# Patient Record
Sex: MALE | Race: WHITE | NOT HISPANIC OR LATINO | Employment: STUDENT | URBAN - METROPOLITAN AREA
[De-identification: names, ages, dates, MRNs, and addresses within clinical notes are randomized per-mention and may not be internally consistent; named-entity substitution may affect disease eponyms.]

---

## 2019-07-09 ENCOUNTER — HOSPITAL ENCOUNTER (INPATIENT)
Facility: HOSPITAL | Age: 19
LOS: 3 days | Discharge: HOME | DRG: 201 | End: 2019-07-14
Attending: EMERGENCY MEDICINE | Admitting: SURGERY
Payer: COMMERCIAL

## 2019-07-09 DIAGNOSIS — R06.00 DYSPNEA: ICD-10-CM

## 2019-07-09 DIAGNOSIS — J93.9 PNEUMOTHORAX, UNSPECIFIED TYPE: ICD-10-CM

## 2019-07-09 DIAGNOSIS — J93.11 PRIMARY SPONTANEOUS PNEUMOTHORAX: ICD-10-CM

## 2019-07-09 DIAGNOSIS — R06.02 SOB (SHORTNESS OF BREATH): ICD-10-CM

## 2019-07-09 DIAGNOSIS — J93.9 PNEUMOTHORAX: Primary | ICD-10-CM

## 2019-07-09 LAB
ALBUMIN SERPL BCP-MCNC: 4.4 G/DL (ref 3.5–5.2)
ALP SERPL-CCNC: 69 U/L (ref 55–135)
ALT SERPL W/O P-5'-P-CCNC: 16 U/L (ref 10–44)
ANION GAP SERPL CALC-SCNC: 8 MMOL/L (ref 8–16)
AST SERPL-CCNC: 16 U/L (ref 10–40)
BASOPHILS # BLD AUTO: 0.01 K/UL (ref 0–0.2)
BASOPHILS NFR BLD: 0.2 % (ref 0–1.9)
BILIRUB SERPL-MCNC: 1.2 MG/DL (ref 0.1–1)
BUN SERPL-MCNC: 19 MG/DL (ref 6–20)
CALCIUM SERPL-MCNC: 9.8 MG/DL (ref 8.7–10.5)
CHLORIDE SERPL-SCNC: 108 MMOL/L (ref 95–110)
CO2 SERPL-SCNC: 26 MMOL/L (ref 23–29)
CREAT SERPL-MCNC: 0.9 MG/DL (ref 0.5–1.4)
DIFFERENTIAL METHOD: NORMAL
EOSINOPHIL # BLD AUTO: 0.1 K/UL (ref 0–0.5)
EOSINOPHIL NFR BLD: 2.1 % (ref 0–8)
ERYTHROCYTE [DISTWIDTH] IN BLOOD BY AUTOMATED COUNT: 12.3 % (ref 11.5–14.5)
EST. GFR  (AFRICAN AMERICAN): >60 ML/MIN/1.73 M^2
EST. GFR  (NON AFRICAN AMERICAN): >60 ML/MIN/1.73 M^2
GLUCOSE SERPL-MCNC: 70 MG/DL (ref 70–110)
HCT VFR BLD AUTO: 41.7 % (ref 40–54)
HGB BLD-MCNC: 14.2 G/DL (ref 14–18)
LYMPHOCYTES # BLD AUTO: 1.5 K/UL (ref 1–4.8)
LYMPHOCYTES NFR BLD: 28.9 % (ref 18–48)
MCH RBC QN AUTO: 29.5 PG (ref 27–31)
MCHC RBC AUTO-ENTMCNC: 34.1 G/DL (ref 32–36)
MCV RBC AUTO: 87 FL (ref 82–98)
MONOCYTES # BLD AUTO: 0.6 K/UL (ref 0.3–1)
MONOCYTES NFR BLD: 11.9 % (ref 4–15)
NEUTROPHILS # BLD AUTO: 3 K/UL (ref 1.8–7.7)
NEUTROPHILS NFR BLD: 56.9 % (ref 38–73)
PLATELET # BLD AUTO: 208 K/UL (ref 150–350)
PMV BLD AUTO: 10.7 FL (ref 9.2–12.9)
POTASSIUM SERPL-SCNC: 3.7 MMOL/L (ref 3.5–5.1)
PROT SERPL-MCNC: 6.8 G/DL (ref 6–8.4)
RBC # BLD AUTO: 4.81 M/UL (ref 4.6–6.2)
SODIUM SERPL-SCNC: 142 MMOL/L (ref 136–145)
WBC # BLD AUTO: 5.3 K/UL (ref 3.9–12.7)

## 2019-07-09 PROCEDURE — 80053 COMPREHEN METABOLIC PANEL: CPT

## 2019-07-09 PROCEDURE — 85025 COMPLETE CBC W/AUTO DIFF WBC: CPT

## 2019-07-09 PROCEDURE — 82962 GLUCOSE BLOOD TEST: CPT

## 2019-07-09 PROCEDURE — 99285 EMERGENCY DEPT VISIT HI MDM: CPT | Mod: 25

## 2019-07-10 PROBLEM — J93.9 PNEUMOTHORAX: Status: ACTIVE | Noted: 2019-07-10

## 2019-07-10 LAB — POCT GLUCOSE: 74 MG/DL (ref 70–110)

## 2019-07-10 PROCEDURE — S4991 NICOTINE PATCH NONLEGEND: HCPCS | Performed by: STUDENT IN AN ORGANIZED HEALTH CARE EDUCATION/TRAINING PROGRAM

## 2019-07-10 PROCEDURE — 25000003 PHARM REV CODE 250: Performed by: STUDENT IN AN ORGANIZED HEALTH CARE EDUCATION/TRAINING PROGRAM

## 2019-07-10 PROCEDURE — G0378 HOSPITAL OBSERVATION PER HR: HCPCS

## 2019-07-10 PROCEDURE — 25000003 PHARM REV CODE 250: Performed by: EMERGENCY MEDICINE

## 2019-07-10 PROCEDURE — 94761 N-INVAS EAR/PLS OXIMETRY MLT: CPT

## 2019-07-10 RX ORDER — SODIUM CHLORIDE 0.9 % (FLUSH) 0.9 %
10 SYRINGE (ML) INJECTION
Status: DISCONTINUED | OUTPATIENT
Start: 2019-07-10 | End: 2019-07-14 | Stop reason: HOSPADM

## 2019-07-10 RX ORDER — HYDROCODONE BITARTRATE AND ACETAMINOPHEN 5; 325 MG/1; MG/1
1 TABLET ORAL EVERY 4 HOURS PRN
Status: DISCONTINUED | OUTPATIENT
Start: 2019-07-10 | End: 2019-07-14 | Stop reason: HOSPADM

## 2019-07-10 RX ORDER — LORATADINE 10 MG/1
10 TABLET ORAL DAILY PRN
COMMUNITY
End: 2024-01-16

## 2019-07-10 RX ORDER — IBUPROFEN 200 MG
1 TABLET ORAL DAILY
Status: DISCONTINUED | OUTPATIENT
Start: 2019-07-10 | End: 2019-07-14 | Stop reason: HOSPADM

## 2019-07-10 RX ORDER — IBUPROFEN 200 MG
200 TABLET ORAL EVERY 6 HOURS PRN
COMMUNITY

## 2019-07-10 RX ADMIN — NICOTINE 1 PATCH: 21 PATCH, EXTENDED RELEASE TRANSDERMAL at 06:07

## 2019-07-10 RX ADMIN — NICOTINE 1 PATCH: 21 PATCH, EXTENDED RELEASE TRANSDERMAL at 10:07

## 2019-07-10 RX ADMIN — HYDROCODONE BITARTRATE AND ACETAMINOPHEN 1 TABLET: 5; 325 TABLET ORAL at 06:07

## 2019-07-10 NOTE — ED NOTES
PT RESTING ON STRETCHER ON CM CONT PULSE OX AND BP READINGS. % NON REBREATHER. NAD. FRIEND AT BEDSIDE. AWAITING BED PLACEMENT. BBS CTA SLIGHTLY DIMINISHED MADDY.

## 2019-07-10 NOTE — ED NOTES
Pt lying on bed, 100% non rebreather in use.Resp even unlabored. Pt voices no complaint at this time. SR up Bed locked and low CB in reach. Pt is boarder.

## 2019-07-10 NOTE — ED NOTES
Pt lying on bed, 100% non rebreather in use. Resp even unlabored. Pt voices no complaint at this time. SR up Bed locked and low CB in reach. Pt is boarder.

## 2019-07-10 NOTE — ED NOTES
Pt presents stating left lung collapsed. Pt c/o left sided chest pain and SOB. States has happened before. BBS CTA with Left upper lobe slightly diminished. Non labored respirations. Tachypnic at times. Pt placed on 100% NRB per verbal order Dr. Wasserman.

## 2019-07-10 NOTE — ED PROVIDER NOTES
"Encounter Date: 7/9/2019    SCRIBE #1 NOTE: I, Isabella Miranda, am scribing for, and in the presence of,  Dr. Wasserman. I have scribed the entire note.       History     Chief Complaint   Patient presents with    Shortness of Breath     Pt states " i am pretty sure i collapsed my lung again." states this has happened before; started with chest pain issues breathing and coughing; states hisbreathing has gotten worse over the lsat couple hours sats noted to be 98% RAStacey Garrison is a 20 yo M who  has a past medical history of Depression, History of psychiatric care, and History of psychiatric hospitalization.    The patient presents to the ED due to sudden onset left sided chest pain with associated SOB. Patient reports history of similar symptoms 9m-1y ago, consistent with pneumothorax requiring chest tube and ICU admission.   Patient reports that chest pain was preceded by gradually worsening cough, then progressed to significant SOB for the last few hours. Patient denies any other complaints at this time. No fever, nausea, vomiting, or weakness.     The history is provided by the patient.     Review of patient's allergies indicates:  No Known Allergies  Past Medical History:   Diagnosis Date    Depression     History of psychiatric care     History of psychiatric hospitalization      No past surgical history on file.  No family history on file.  Social History     Tobacco Use    Smoking status: Never Smoker   Substance Use Topics    Alcohol use: No    Drug use: No     Review of Systems   Constitutional: Negative for chills and fever.   HENT: Negative for sore throat.    Respiratory: Positive for shortness of breath. Negative for cough.    Cardiovascular: Negative for chest pain.   Gastrointestinal: Negative for nausea and vomiting.   Genitourinary: Negative for dysuria, frequency and urgency.   Musculoskeletal: Negative for back pain, neck pain and neck stiffness.   Skin: Negative for rash and wound. "   Neurological: Negative for syncope and weakness.   Hematological: Does not bruise/bleed easily.   Psychiatric/Behavioral: Negative for agitation, behavioral problems and confusion.       Physical Exam     Initial Vitals [07/09/19 2138]   BP Pulse Resp Temp SpO2   (!) 119/59 (!) 59 16 97.5 °F (36.4 °C) 98 %      MAP       --         Physical Exam    Nursing note and vitals reviewed.  Constitutional: He appears well-developed and well-nourished. He is not diaphoretic. No distress.   HENT:   Head: Normocephalic and atraumatic.   Mouth/Throat: Oropharynx is clear and moist.   Eyes: EOM are normal. Pupils are equal, round, and reactive to light.   Neck: No tracheal deviation present.   Cardiovascular: Normal rate, regular rhythm, normal heart sounds and intact distal pulses.   Pulmonary/Chest: No stridor. No respiratory distress. He has decreased breath sounds.   Diminished breath sounds on the left  No crepitus   Abdominal: Soft. He exhibits no distension and no mass. There is no tenderness.   Musculoskeletal: Normal range of motion. He exhibits no edema.   Neurological: He is alert and oriented to person, place, and time. No cranial nerve deficit or sensory deficit.   Skin: Skin is warm and dry. Capillary refill takes less than 2 seconds. No rash noted.   Psychiatric: He has a normal mood and affect. His behavior is normal. Thought content normal.         ED Course   Procedures  Labs Reviewed   COMPREHENSIVE METABOLIC PANEL - Abnormal; Notable for the following components:       Result Value    Total Bilirubin 1.2 (*)     All other components within normal limits   CBC W/ AUTO DIFFERENTIAL   POCT GLUCOSE          Imaging Results           CT Chest Without Contrast (Final result)  Result time 07/10/19 18:06:47    Final result by Adrianna Sheppard MD (07/10/19 18:06:47)                 Impression:      Relatively stable 30 40% left-sided pneumothorax.  Trace left pleural effusion.    This report was flagged in Epic  as abnormal.      Electronically signed by: Adrianna Sheppard  Date:    07/10/2019  Time:    18:06             Narrative:    EXAMINATION:  CT CHEST WITHOUT CONTRAST    CLINICAL HISTORY:  bleb disease, spontaneous pneumo;    TECHNIQUE:  Contiguous axial 5 mm images was obtained from the lung apices through the lung bases.  No intravenous contrast was given.  Coronal and sagittal reformatted images were provided.    COMPARISON:  07/10/2019 at 12:34    FINDINGS:  There is 30-40% left-sided pneumothorax, which does not significantly appear to be changed when compared to the previous examination.  A bleb is seen at the right lung apex with mild right apical scarring.    There is no evidence of mediastinal, hilar, or axillary adenopathy.    There is trace left pleural effusion.  There is no significant pericardial effusion.    The heart size is within normal limits.    There are no displaced left-sided rib fractures.  Mild scoliosis is seen with convexity to the right or splinting.                               CT Chest Without Contrast (Final result)  Result time 07/10/19 12:53:48    Final result by Isidoro Medina MD (07/10/19 12:53:48)                 Impression:      1. Mild left pneumothorax with a small amount of associated subpleural fluid on the left.  No large focal consolidation.  2. Please see above for several additional findings.      Electronically signed by: Isidoro Medina MD  Date:    07/10/2019  Time:    12:53             Narrative:    EXAMINATION:  CT CHEST WITHOUT CONTRAST    CLINICAL HISTORY:  Pneumothorax, known, follow up; Pneumothorax, unspecified    TECHNIQUE:  Low dose axial images, sagittal and coronal reformations were obtained from the thoracic inlet to the lung bases. Contrast was not administered.    COMPARISON:  Radiograph 07/10/2019    FINDINGS:  The structures at the base of the neck are grossly unremarkable.  No significant mediastinal lymphadenopathy.  The heart is not enlarged.  The  thoracic aorta tapers normally.  The visualized portions of the kidneys, spleen, pancreas and liver are grossly unremarkable.    The airways are grossly patent.  There is minimal right basilar dependent atelectasis.  There is a mild left pneumothorax.  There is left basilar dependent atelectasis/compressive atelectasis.  There may be a small amount of subpleural fluid on the left.  No left large focal consolidation.    Degenerative changes are noted of the lower thoracic spine at several levels.  No acute displaced rib fracture.                                X-Ray Chest PA And Lateral (Final result)  Result time 07/10/19 08:53:57    Final result by Luzmaria Delgadillo MD (07/10/19 08:53:57)                 Impression:      Unchanged  moderate size left pneumothorax    This report was flagged in Epic as abnormal.      Electronically signed by: Luzmaria Delgadillo MD  Date:    07/10/2019  Time:    08:53             Narrative:    EXAMINATION:  XR CHEST PA AND LATERAL    CLINICAL HISTORY:  pneumothorax; Pneumothorax, unspecified    TECHNIQUE:  PA and lateral views of the chest were performed.    COMPARISON:  07/09/2019    FINDINGS:  Moderate size left pneumothorax, unchanged.  The cardiac silhouette is normal in size, midline.  The right hemithorax is well aerated.  No pleural effusion.  The osseous structures appear normal.                               X-Ray Chest PA And Lateral (Final result)  Result time 07/09/19 22:29:47    Final result by Parth Rojas MD (07/09/19 22:29:47)                 Impression:      Moderate left-sided pneumothorax occupying approximately 20% of the left hemithorax.  No evidence of midline shift.    Case discussed Dr. Wasserman on 07/09/2019 at 22:29 hours.    Electronically signed by resident: Shanice Lam MD  Date:    07/09/2019  Time:    22:18    Electronically signed by: Parth Rojas MD  Date:    07/09/2019  Time:    22:29             Narrative:    EXAMINATION:  XR CHEST PA AND  LATERAL    CLINICAL HISTORY:  Dyspnea, unspecified    TECHNIQUE:  PA and lateral views of the chest were performed.    COMPARISON:  None    FINDINGS:  There is a moderate left-sided pneumothorax occupying approximately 20% of the left hemithorax..  Mediastinal structures are midline. No evidence of consolidation or pleural effusion.    The cardiomediastinal silhouette is normal.Bones are intact.  There is no evidence of free air beneath the hemidiaphragms.                                 Medical Decision Making:   Initial Assessment:   This is a 19-year-old male who presents with sudden onset of left-sided chest pain earlier today.  He has associated shortness of breath.  He denies other symptoms. He reports that this has happened before and he had a collapsed lung and required a chest tube approximately 9 months to a year ago.  He is vitally stable he has diminished breath sounds to the left side there is no apparent crepitus or bruising noted. He is speaking in full sentences and not in respiratory distress.  He does appear uncomfortable.  Differential Diagnosis:   Differential Diagnosis includes, but is not limited to:  PE, MI/ACS, pneumothorax, pericardial effusion/tamonade, pneumonia, lung abscess, pericarditis/myocarditis, pleural effusion, lung mass, CHF exacerbation, asthma exacerbation, COPD exacerbation, aspirated/ingested foreign body, airway obstruction, CO poisoning, anemia, metabolic derangement, allergy/atopy, influenza, viral URI, viral syndrome.    ED Management:  20 yo male with L PTX approximately 20% collpase  No tracheal deviation  Vital signs are stable  Discussed with [facility] surgery - will manage conservatively for now with NRB.  Patient to be admitted to surgery for further treatment                       ED Course as of Jul 09 2203   Tue Jul 09, 2019 2203 EKG:  Rate 63 normal sinus rhythm with sinus arrhythmia no ST segment changes no STEMI.    [RN]      ED Course User Index  [RN] Rhett SERVIN  Lux Monk MD     Clinical Impression:       ICD-10-CM ICD-9-CM   1. Dyspnea R06.00 786.09           Scribe Attestation I, Rhett Wasserman,  personally performed the services described in this documentation. All medical record entries made by the scribe were at my direction and in my presence.  I have reviewed the chart and agree that the record reflects my personal performance and is accurate and complete. Rhett Wasserman M.D. 6:52 PM07/11/2019     Portions of this note were dictated using voice recognition software and may contain dictation related errors in spelling/grammar/syntax not found on text review                    Rhett Wasserman Jr., MD  07/11/19 1227

## 2019-07-10 NOTE — H&P
GENERAL SURGERY  Admission H&P    Staff: TORITO Hernandez MD    Chief complaint: Shortness of breath    HPI: 19 year old male presented to the ED overnight with sudden onset SOB and L sided c hest pain. Says that he had similar symptoms about 1 year ago and was found to have a collapsed lung requiring a L sided chest tube at [facility]. He has not had any follow up since that admission. Started on 100% NRB by ER and reports that pain and SOB is significantly better. No fever/chills, weakness, nausea/vomiting.     ROS: 10 point ROS negative other than stated above in HPI    Review of patient's allergies indicates:  No Known Allergies    No current facility-administered medications on file prior to encounter.      No current outpatient medications on file prior to encounter.       Past Medical History:   Diagnosis Date    Depression     History of psychiatric care     History of psychiatric hospitalization     Pneumothorax        Past Surgical History  Negative    Social History:  Smokes cigarettes and marijuana    Family History:  Non contributory    Temp:  [97.4 °F (36.3 °C)-97.9 °F (36.6 °C)] 97.9 °F (36.6 °C)  Pulse:  [56-71] 70  Resp:  [8-36] 36  SpO2:  [96 %-100 %] 96 %  BP: (102-121)/(59-86) 102/64    EXAM  GEN: A&Ox3, NAD  HEENT: Non rebreather mask in place  CV: RRR. No tachycardia.   RESP: Non-labored breathing. Breath sounds diminished at L apex with patient upright.  ABD: Soft, NT/ND  EXT: Warm, well perfused  SKIN: No rashes, wounds, or other skin lesions  NEURO: No focal deficits    LABS  All labs reviewed and unremarkable    IMAGING  CXR --  20% L pneumothorax with no evidence of mediastinal shift.     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

## 2019-07-10 NOTE — ED PROVIDER NOTES
"Encounter Date: 7/9/2019    SCRIBE #1 NOTE: I, Isabella Miranda, am scribing for, and in the presence of,  Dr. Wasserman. I have scribed the entire note.       History     Chief Complaint   Patient presents with    Shortness of Breath     Pt states " i am pretty sure i collapsed my lung again." states this has happened before; started with chest pain issues breathing and coughing; states hisbreathing has gotten worse over the lsat couple hours sats noted to be 98% RAStacey Garrison is a ** yo M who  has a past medical history of Depression, History of psychiatric care, and History of psychiatric hospitalization.    The patient presents to the ED due to   Denies fever, chills, cough/cold symptoms, SOB, chest pain, N/V/D, abdominal pain, numbness/tingling, weakness or any other concerning symptoms.      The history is provided by the patient.     Review of patient's allergies indicates:  No Known Allergies  Past Medical History:   Diagnosis Date    Depression     History of psychiatric care     History of psychiatric hospitalization      No past surgical history on file.  No family history on file.  Social History     Tobacco Use    Smoking status: Never Smoker   Substance Use Topics    Alcohol use: No    Drug use: No     Review of Systems   Constitutional: Negative for chills and fever.   HENT: Negative for sore throat.    Respiratory: Negative for cough and shortness of breath.    Cardiovascular: Negative for chest pain.   Gastrointestinal: Negative for nausea and vomiting.   Genitourinary: Negative for dysuria, frequency and urgency.   Musculoskeletal: Negative for back pain, neck pain and neck stiffness.   Skin: Negative for rash and wound.   Neurological: Negative for syncope and weakness.   Hematological: Does not bruise/bleed easily.   Psychiatric/Behavioral: Negative for agitation, behavioral problems and confusion.       Physical Exam     Initial Vitals [07/09/19 2138]   BP Pulse Resp Temp SpO2   (!) " 119/59 (!) 59 16 97.5 °F (36.4 °C) 98 %      MAP       --         Physical Exam    Nursing note and vitals reviewed.  Constitutional: He appears well-developed and well-nourished. He is not diaphoretic. No distress.   HENT:   Head: Normocephalic and atraumatic.   Mouth/Throat: Oropharynx is clear and moist.   Eyes: EOM are normal. Pupils are equal, round, and reactive to light.   Neck: No tracheal deviation present.   Cardiovascular: Normal rate, regular rhythm, normal heart sounds and intact distal pulses.   Pulmonary/Chest: Breath sounds normal. No stridor. No respiratory distress.   Abdominal: Soft. He exhibits no distension and no mass. There is no tenderness.   Musculoskeletal: Normal range of motion. He exhibits no edema.   Neurological: He is alert and oriented to person, place, and time. No cranial nerve deficit or sensory deficit.   Skin: Skin is warm and dry. Capillary refill takes less than 2 seconds. No rash noted.   Psychiatric: He has a normal mood and affect. His behavior is normal. Thought content normal.         ED Course   Procedures  Labs Reviewed - No data to display       Imaging Results    None                            ED Course as of Jul 09 2203   Tue Jul 09, 2019 2203 EKG:  Rate 63 normal sinus rhythm with sinus arrhythmia no ST segment changes no STEMI.    [RN]      ED Course User Index  [RN] Rhett Wasserman Jr., MD     Clinical Impression:       ICD-10-CM ICD-9-CM   1. Dyspnea R06.00 786.09           Scribe Attestation***  Portions of this note were dictated using voice recognition software and may contain dictation related errors in spelling/grammar/syntax not found on text review

## 2019-07-11 PROCEDURE — 94761 N-INVAS EAR/PLS OXIMETRY MLT: CPT

## 2019-07-11 PROCEDURE — 25000003 PHARM REV CODE 250: Performed by: EMERGENCY MEDICINE

## 2019-07-11 PROCEDURE — 27000221 HC OXYGEN, UP TO 24 HOURS

## 2019-07-11 PROCEDURE — 25000003 PHARM REV CODE 250: Performed by: STUDENT IN AN ORGANIZED HEALTH CARE EDUCATION/TRAINING PROGRAM

## 2019-07-11 PROCEDURE — A4216 STERILE WATER/SALINE, 10 ML: HCPCS | Performed by: EMERGENCY MEDICINE

## 2019-07-11 PROCEDURE — S4991 NICOTINE PATCH NONLEGEND: HCPCS | Performed by: STUDENT IN AN ORGANIZED HEALTH CARE EDUCATION/TRAINING PROGRAM

## 2019-07-11 PROCEDURE — 11000001 HC ACUTE MED/SURG PRIVATE ROOM

## 2019-07-11 PROCEDURE — 63600175 PHARM REV CODE 636 W HCPCS: Performed by: STUDENT IN AN ORGANIZED HEALTH CARE EDUCATION/TRAINING PROGRAM

## 2019-07-11 RX ORDER — MORPHINE SULFATE 2 MG/ML
4 INJECTION, SOLUTION INTRAMUSCULAR; INTRAVENOUS ONCE
Status: COMPLETED | OUTPATIENT
Start: 2019-07-11 | End: 2019-07-11

## 2019-07-11 RX ADMIN — NICOTINE 1 PATCH: 21 PATCH, EXTENDED RELEASE TRANSDERMAL at 05:07

## 2019-07-11 RX ADMIN — HYDROCODONE BITARTRATE AND ACETAMINOPHEN 1 TABLET: 5; 325 TABLET ORAL at 02:07

## 2019-07-11 RX ADMIN — HYDROCODONE BITARTRATE AND ACETAMINOPHEN 1 TABLET: 5; 325 TABLET ORAL at 08:07

## 2019-07-11 RX ADMIN — MORPHINE SULFATE 4 MG: 2 INJECTION, SOLUTION INTRAMUSCULAR; INTRAVENOUS at 07:07

## 2019-07-11 RX ADMIN — HYDROCODONE BITARTRATE AND ACETAMINOPHEN 1 TABLET: 5; 325 TABLET ORAL at 04:07

## 2019-07-11 RX ADMIN — Medication 10 ML: at 04:07

## 2019-07-11 NOTE — PROGRESS NOTES
Surgery Progress Note    S:  NAEO. Patient uncomfortable this morning. On non-rebreather, but writes his hurt his back vomiting and his breathing becomes uncontrolled when in pain. Encouraged patient to lay on his left side or on his back, but he is more comfortable on his right.     O:  Temp:  [97.1 °F (36.2 °C)-99.7 °F (37.6 °C)] 97.3 °F (36.3 °C)  Pulse:  [56-87] 68  Resp:  [10-37] 14  SpO2:  [96 %-100 %] 100 %  BP: (101-119)/(59-84) 113/59    Physical Exam:  Gen: Patient on non-rebreather, mildly distressed. Alert and oriented x3.  HEENT: normocephalic and atraumatic. EOMI.   Resp: Tachypneic, no breath sounds on left.   CV: regular rate  Abd: soft, non-tender  Ext: warm and well perfused  MSK: normal range of motion, normal strength  Neuro: no focal deficits, normal sensation    CT Chest: Relatively stable 30 40% left-sided pneumothorax.  Trace left pleural effusion.  CXR: X-ray Chest Pa And Lateral    Result Date: 7/10/2019  Unchanged  moderate size left pneumothorax This report was flagged in Epic as abnormal. Electronically signed by: Luzmaria Delgadillo MD Date:    07/10/2019 Time:    08:53    A/P:  19 year old male with recurrent spontaneous L pneumothorax  - Admit to surgery  - Continue 100% nonrebreather O2  - CXR showed moderate size left pneumothorax  - May ultimately require pigtail chest tube placement  - CT Chest: 30-40% pneumothorax. Bleb seen on right lung apex with mild right apical scarring.   -  Will place pigtail drain today.     Holley Elizondo MD  [facility] General Surgery

## 2019-07-11 NOTE — PLAN OF CARE
VN completed admission questions with patient. Plan of care was discussed including VTE prophylaxis of SCDs.  All questions answered. Education given on safety measures and using call light before getting out of bed by himself. Patient verbalized understanding. Bed locked and in lowest position. Alarm on.

## 2019-07-11 NOTE — NURSING
Pt stated he is too nauseous to swallow a pill; notified MD. IV morphine ordered for patient. Administered IV morphine to patient. Pt resting in bed with 100% NRB sats at 100%

## 2019-07-11 NOTE — NURSING
C/o can't breath. NRBM and cont pulse ox not in place. Replaced O2 per NRBM at 100% and cont pulse ox, O2 sat 99%. B/P 102/56. Skin cool and dry. BS very diminished to left side, clear on right but shallow and tachypneic at 24. Pt is lying on right side, encouraged to lay on left side or back but refused- states it hurts his back. Unable to speak, writing to communicate. HOB elevate 30 degrees, pt can't tolerate raising head more at this time. Surgery resident called.

## 2019-07-11 NOTE — NURSING
Surgery resident at bedside to examine patient. C/O pain to back and right side/ ribs. Hydrocodone given per Shelley HAGAN.

## 2019-07-11 NOTE — PLAN OF CARE
This  put name on white board and explained blue discharge folder to patient. Discharge planning brochure and/or business card given to patient.  Patient verbalized understanding.    TN met with patient. He states prior to arrival he was living with his roommate. States that he is independent with all ADL's and able to drive self. Per patient, one of his friends or parents will bring her home. Pt denies and needs for HH or DME. Will continue to monitor.      07/11/19 1414   Discharge Assessment   Assessment Type Discharge Planning Assessment   Confirmed/corrected address and phone number on facesheet? Yes   Assessment information obtained from? Patient;Medical Record   Expected Length of Stay (days) 3   Communicated expected length of stay with patient/caregiver yes   Prior to hospitilization cognitive status: Alert/Oriented   Prior to hospitalization functional status: Independent   Current cognitive status: Alert/Oriented   Current Functional Status: Independent   Facility Arrived From: ED   Lives With friend(s)   Able to Return to Prior Arrangements yes   Is patient able to care for self after discharge? Yes   Who are your caregiver(s) and their phone number(s)? Antionette (mother) [phone]   Patient's perception of discharge disposition home or selfcare   Readmission Within the Last 30 Days no previous admission in last 30 days   Patient currently being followed by outpatient case management? No   Patient currently receives any other outside agency services? No   Equipment Currently Used at Home none   Do you have any problems affording any of your prescribed medications? No   Is the patient taking medications as prescribed? yes   Does the patient have transportation home? Yes   Transportation Anticipated family or friend will provide   Does the patient receive services at the [facility]? No   Discharge Plan A Home;Home with family   DME Needed Upon Discharge  none   Patient/Family in Agreement  with Plan yes

## 2019-07-11 NOTE — PLAN OF CARE
Problem: Adult Inpatient Plan of Care  Goal: Plan of Care Review  Outcome: Ongoing (interventions implemented as appropriate)  Patient drowsy, sleeping throughout shift. Complaints of pain with relief from PRN norco. Up to toilet independently. Regular diet, tolerated poorly. Continuous pulse ox on 100% NRB with sats at 100%. Absent breath sounds to MD SHANNAN aware. Safety maintained. Bed locked and in lowest position. Call light and personal items within reach. Advised pt to call for assistance, pt verbalized understanding.

## 2019-07-12 PROCEDURE — S4991 NICOTINE PATCH NONLEGEND: HCPCS | Performed by: STUDENT IN AN ORGANIZED HEALTH CARE EDUCATION/TRAINING PROGRAM

## 2019-07-12 PROCEDURE — 25000003 PHARM REV CODE 250: Performed by: STUDENT IN AN ORGANIZED HEALTH CARE EDUCATION/TRAINING PROGRAM

## 2019-07-12 PROCEDURE — 11000001 HC ACUTE MED/SURG PRIVATE ROOM

## 2019-07-12 PROCEDURE — 25000003 PHARM REV CODE 250: Performed by: EMERGENCY MEDICINE

## 2019-07-12 PROCEDURE — 94761 N-INVAS EAR/PLS OXIMETRY MLT: CPT

## 2019-07-12 PROCEDURE — 27000221 HC OXYGEN, UP TO 24 HOURS

## 2019-07-12 RX ORDER — POLYETHYLENE GLYCOL 3350 17 G/17G
17 POWDER, FOR SOLUTION ORAL 2 TIMES DAILY PRN
Status: DISCONTINUED | OUTPATIENT
Start: 2019-07-12 | End: 2019-07-14 | Stop reason: HOSPADM

## 2019-07-12 RX ADMIN — HYDROCODONE BITARTRATE AND ACETAMINOPHEN 1 TABLET: 5; 325 TABLET ORAL at 04:07

## 2019-07-12 RX ADMIN — POLYETHYLENE GLYCOL 3350 17 G: 17 POWDER, FOR SOLUTION ORAL at 09:07

## 2019-07-12 RX ADMIN — NICOTINE 1 PATCH: 21 PATCH, EXTENDED RELEASE TRANSDERMAL at 11:07

## 2019-07-12 RX ADMIN — HYDROCODONE BITARTRATE AND ACETAMINOPHEN 1 TABLET: 5; 325 TABLET ORAL at 10:07

## 2019-07-12 RX ADMIN — HYDROCODONE BITARTRATE AND ACETAMINOPHEN 1 TABLET: 5; 325 TABLET ORAL at 11:07

## 2019-07-12 NOTE — PROGRESS NOTES
Surgery Progress Note    S:  NAEO. Patient more comfortable this AM. Tenderness to left thoracic back. Patient still considering chest tube.     O:  Temp:  [97.3 °F (36.3 °C)-99 °F (37.2 °C)] 98 °F (36.7 °C)  Pulse:  [66-78] 66  Resp:  [14-17] 17  SpO2:  [100 %] 100 %  BP: ()/(53-69) 120/69    Physical Exam:  Gen: Patient on non-rebreather, mildly distressed. Alert and oriented x3.  HEENT: normocephalic and atraumatic. EOMI.   Resp: Tachypneic, no breath sounds on left. Tender to left thoracic back along lower border of scapula.  CV: regular rate  Abd: soft, non-tender  Ext: warm and well perfused  MSK: normal range of motion, normal strength  Neuro: no focal deficits, normal sensation    CT Chest: Relatively stable 30 40% left-sided pneumothorax.  Trace left pleural effusion.  CXR: X-ray Chest Pa And Lateral    Result Date: 7/10/2019  Unchanged  moderate size left pneumothorax This report was flagged in Epic as abnormal. Electronically signed by: Luzmaria Delgadillo MD Date:    07/10/2019 Time:    08:53  X-ray Chest 1 View    Result Date: 7/12/2019  Slight interval improvement of the previously identified left-sided pneumothorax. Electronically signed by: Kahlil Linder Date:    07/12/2019 Time:    07:22    A/P:  19 year old male with recurrent spontaneous L pneumothorax  - Admit to surgery  - Continue 100% nonrebreather O2  - CXR showed moderate size left pneumothorax. Repeat on 7/12 only slight improvement.   - May ultimately require pigtail chest tube placement. Patient did not want chest tube placed. Reiterated to patient that chest tube may be necessary to treat this pneumothorax.   - CT Chest: 30-40% pneumothorax. Bleb seen on right lung apex with mild right apical scarring.     Holley Elizondo MD  PGY-2  [employer] General Surgery

## 2019-07-13 PROCEDURE — S4991 NICOTINE PATCH NONLEGEND: HCPCS | Performed by: STUDENT IN AN ORGANIZED HEALTH CARE EDUCATION/TRAINING PROGRAM

## 2019-07-13 PROCEDURE — 25000003 PHARM REV CODE 250: Performed by: STUDENT IN AN ORGANIZED HEALTH CARE EDUCATION/TRAINING PROGRAM

## 2019-07-13 PROCEDURE — 27000221 HC OXYGEN, UP TO 24 HOURS

## 2019-07-13 PROCEDURE — 94761 N-INVAS EAR/PLS OXIMETRY MLT: CPT

## 2019-07-13 PROCEDURE — 25000003 PHARM REV CODE 250: Performed by: EMERGENCY MEDICINE

## 2019-07-13 PROCEDURE — 11000001 HC ACUTE MED/SURG PRIVATE ROOM

## 2019-07-13 RX ADMIN — HYDROCODONE BITARTRATE AND ACETAMINOPHEN 1 TABLET: 5; 325 TABLET ORAL at 08:07

## 2019-07-13 RX ADMIN — NICOTINE 1 PATCH: 21 PATCH, EXTENDED RELEASE TRANSDERMAL at 08:07

## 2019-07-13 RX ADMIN — HYDROCODONE BITARTRATE AND ACETAMINOPHEN 1 TABLET: 5; 325 TABLET ORAL at 05:07

## 2019-07-13 RX ADMIN — HYDROCODONE BITARTRATE AND ACETAMINOPHEN 1 TABLET: 5; 325 TABLET ORAL at 01:07

## 2019-07-13 NOTE — PLAN OF CARE
Problem: Adult Inpatient Plan of Care  Goal: Plan of Care Review  Outcome: Ongoing (interventions implemented as appropriate)  Patient AAOx4, VSS Patient c/o pain, managed with PRN Norco tablet. Patient tolerating activity, ambulates in room independently. Free from falls. Patient remains on non-rebreathing mask throughout shift per MD order. Patient tolerating diet, no nausea or vomiting. Patient in NAD. Call bell in reach. Cardiac monitoring remains in place. Bed alarm in place. Safety maintained, will continue to monitor.     Problem: Pain Acute  Goal: Optimal Pain Control  Outcome: Ongoing (interventions implemented as appropriate)       Problem: Fall Injury Risk  Goal: Absence of Fall and Fall-Related Injury  Outcome: Ongoing (interventions implemented as appropriate)

## 2019-07-13 NOTE — PROGRESS NOTES
"Surgery Progress Note    S:  AVANI. Continues to complain of back pain.  States he "feels like im swallowing my organs" with mobility.  No cp/sob.  Resting comfortably.     O:  Temp:  [96.5 °F (35.8 °C)-98.1 °F (36.7 °C)] 96.5 °F (35.8 °C)  Pulse:  [61-88] 63  Resp:  [17-20] 20  SpO2:  [100 %] 100 %  BP: ()/(54-78) 93/55    Physical Exam:  Gen: Patient not wearing nonrebreather, comfortably resting. Alert and oriented x3.  HEENT: Normocephalic and atraumatic. EOMI.   Resp: Tachypneic, decreased breath sounds on the left .  CV: regular rate   Abd: soft, non-tender  Ext: warm and well perfused  MSK: normal range of motion, normal strength  Neuro: no focal deficits, normal sensation     CXR with continued interval decrease in L pneumothorax       A/P:  19 year old male with recurrent spontaneous L pneumothorax    - Continue 100% nonrebreather O2  - CXR with continued decrease of pneumothorax.  Pt declined chest tube placement, even with asha discussion about risks.  Will continue to monitor with dialy CXR.   - Will ultimately require blebectomy. Will plan for VATS Monday if pt is amenable.  - Pain med prn      Jazzmine Mosher MD  PGY-2  [employer]    Agree qith above resident history,physical assessment and plan    Stable cxr improving c/o pain at back  Recurrence of pneumon left side and having pneumo on right side explained  Will continue present care  "

## 2019-07-13 NOTE — PLAN OF CARE
Problem: Adult Inpatient Plan of Care  Goal: Plan of Care Review  Outcome: Ongoing (interventions implemented as appropriate)  Patient awake, alert, and oriented x 4. Patient safety maintained. Request something for constipation. Prn medication for constipation administered. Also complains of pain. Prn pain medication administered. Appears to be in no distress. Slept well throughout the night. Will continue to monitor.

## 2019-07-14 VITALS
HEART RATE: 81 BPM | SYSTOLIC BLOOD PRESSURE: 109 MMHG | OXYGEN SATURATION: 100 % | WEIGHT: 122.13 LBS | DIASTOLIC BLOOD PRESSURE: 67 MMHG | BODY MASS INDEX: 17.1 KG/M2 | RESPIRATION RATE: 22 BRPM | TEMPERATURE: 98 F | HEIGHT: 71 IN

## 2019-07-14 PROCEDURE — 25000003 PHARM REV CODE 250: Performed by: STUDENT IN AN ORGANIZED HEALTH CARE EDUCATION/TRAINING PROGRAM

## 2019-07-14 PROCEDURE — S4991 NICOTINE PATCH NONLEGEND: HCPCS | Performed by: STUDENT IN AN ORGANIZED HEALTH CARE EDUCATION/TRAINING PROGRAM

## 2019-07-14 PROCEDURE — 25000003 PHARM REV CODE 250: Performed by: EMERGENCY MEDICINE

## 2019-07-14 RX ORDER — IBUPROFEN 200 MG
1 TABLET ORAL DAILY
Qty: 28 PATCH | Refills: 1 | Status: SHIPPED | OUTPATIENT
Start: 2019-07-15 | End: 2019-07-14

## 2019-07-14 RX ORDER — IBUPROFEN 200 MG
1 TABLET ORAL DAILY
Qty: 28 PATCH | Refills: 1 | Status: SHIPPED | OUTPATIENT
Start: 2019-07-15 | End: 2024-01-14

## 2019-07-14 RX ADMIN — HYDROCODONE BITARTRATE AND ACETAMINOPHEN 1 TABLET: 5; 325 TABLET ORAL at 12:07

## 2019-07-14 RX ADMIN — NICOTINE 1 PATCH: 21 PATCH, EXTENDED RELEASE TRANSDERMAL at 08:07

## 2019-07-14 NOTE — PROGRESS NOTES
Patient AAOx4, VSS, Patient tolerating activity, ambulating in room independently. Patient tolerating diet, no nausea or vomiting. Patient preparing for discharge. Nicotine script  given to patient.  Transport for wheelchair requested

## 2019-07-14 NOTE — PLAN OF CARE
Discharge orders noted, no HH or HME ordered.    Pt's nurse will go over medications/signs and symptoms prior to discharge    July 18, 2019 Schedule an appointment with Paris Leary MD as soon as possible for a visit on 7/18/2019 Thursday Jul 18, 2019   Ambulatory Referral placed with General Surgery, Offices closed for Weekend. Patient to schedule own follow up appointment.  [address]   [address]   [facility] [address]   [phone]             07/14/19 0955   Final Note   Assessment Type Final Discharge Note   Anticipated Discharge Disposition Home   What phone number can be called within the next 1-3 days to see how you are doing after discharge? [phone]   Hospital Follow Up  Appt(s) scheduled? No  (Ambulatory Referral placed with General Surgery, Offices closed for Weekend. Patient to schedule own follow up appointment.)   Right Care Referral Info   Post Acute Recommendation No Care     Melody Delgado, RN Transitional Navigator  [phone]

## 2019-07-14 NOTE — DISCHARGE SUMMARY
[facility]  General Surgery  Neuroendocrine Tumor Service  Discharge Summary      Patient Name: Radhames Garrison  MRN: [id number]  Admission Date: 7/9/2019  Hospital Length of Stay: 3 days  Discharge Date and Time:  07/14/2019 9:51 AM  Attending Physician: TORITO Hernandez MD   Discharging Provider: Holley Elizondo MD  Primary Care Provider: Scarlet Hare MD     HPI: 19 year old male presented to the ED overnight with sudden onset SOB and L sided c hest pain. Says that he had similar symptoms about 1 year ago and was found to have a collapsed lung requiring a L sided chest tube at [facility]. He has not had any follow up since that admission. Started on 100% NRB by ER and reports that pain and SOB is significantly better. No fever/chills, weakness, nausea/vomiting.     * No surgery found *     Hospital Course: Patient was admitted to the General Surgery Team and was placed on oxygen with a non-rebreather. He was monitored with daily CXR. He refused placement of a left chest tube. He complained of pain and this was controlled with norco 5mg as needed. He tolerated regular diet and his breathing improved throughout his admission. On day of discharge, patient was on room air, in no distress, and tolerating full diet. His CXR show continued improvement. He was discharged in stable condition with instructions for smoking cessation and follow-up. He will be see in Dr. Toledo's clinic on 7/18/19 for follow-up as he will likely need a VATS procedure in the future.     Consults: None    Significant Diagnostic Studies:   CXR 7/14: Small left pneumothorax appears similar to slightly decreased.  Remainder of the lungs appear stable.  Cardiomediastinal silhouette is unchanged.  CXR 7/13: Left-sided pneumothorax again noted diminished size otherwise stable radiographic appearance.  CXR 7/12: Slight interval improvement of the previously identified left-sided pneumothorax.  CXR 7/11: Slight decrease in  size of the left-sided pneumothorax compared to July 10.    Imaging Results           CT Chest Without Contrast (Final result)  Result time 07/10/19 18:06:47    Final result by Adrianna Sheppard MD (07/10/19 18:06:47)                 Impression:      Relatively stable 30 40% left-sided pneumothorax.  Trace left pleural effusion.    This report was flagged in Epic as abnormal.      Electronically signed by: Adrianna Sheppard  Date:    07/10/2019  Time:    18:06             Narrative:    EXAMINATION:  CT CHEST WITHOUT CONTRAST    CLINICAL HISTORY:  bleb disease, spontaneous pneumo;    TECHNIQUE:  Contiguous axial 5 mm images was obtained from the lung apices through the lung bases.  No intravenous contrast was given.  Coronal and sagittal reformatted images were provided.    COMPARISON:  07/10/2019 at 12:34    FINDINGS:  There is 30-40% left-sided pneumothorax, which does not significantly appear to be changed when compared to the previous examination.  A bleb is seen at the right lung apex with mild right apical scarring.    There is no evidence of mediastinal, hilar, or axillary adenopathy.    There is trace left pleural effusion.  There is no significant pericardial effusion.    The heart size is within normal limits.    There are no displaced left-sided rib fractures.  Mild scoliosis is seen with convexity to the right or splinting.                               CT Chest Without Contrast (Final result)  Result time 07/10/19 12:53:48    Final result by Isidoro Medina MD (07/10/19 12:53:48)                 Impression:      1. Mild left pneumothorax with a small amount of associated subpleural fluid on the left.  No large focal consolidation.  2. Please see above for several additional findings.      Electronically signed by: Isidoro Medina MD  Date:    07/10/2019  Time:    12:53             Narrative:    EXAMINATION:  CT CHEST WITHOUT CONTRAST    CLINICAL HISTORY:  Pneumothorax, known, follow up; Pneumothorax,  unspecified    TECHNIQUE:  Low dose axial images, sagittal and coronal reformations were obtained from the thoracic inlet to the lung bases. Contrast was not administered.    COMPARISON:  Radiograph 07/10/2019    FINDINGS:  The structures at the base of the neck are grossly unremarkable.  No significant mediastinal lymphadenopathy.  The heart is not enlarged.  The thoracic aorta tapers normally.  The visualized portions of the kidneys, spleen, pancreas and liver are grossly unremarkable.    The airways are grossly patent.  There is minimal right basilar dependent atelectasis.  There is a mild left pneumothorax.  There is left basilar dependent atelectasis/compressive atelectasis.  There may be a small amount of subpleural fluid on the left.  No left large focal consolidation.    Degenerative changes are noted of the lower thoracic spine at several levels.  No acute displaced rib fracture.                                X-Ray Chest PA And Lateral (Final result)  Result time 07/10/19 08:53:57    Final result by Luzmaria Delgadillo MD (07/10/19 08:53:57)                 Impression:      Unchanged  moderate size left pneumothorax    This report was flagged in Epic as abnormal.      Electronically signed by: Luzmaria Delgadillo MD  Date:    07/10/2019  Time:    08:53             Narrative:    EXAMINATION:  XR CHEST PA AND LATERAL    CLINICAL HISTORY:  pneumothorax; Pneumothorax, unspecified    TECHNIQUE:  PA and lateral views of the chest were performed.    COMPARISON:  07/09/2019    FINDINGS:  Moderate size left pneumothorax, unchanged.  The cardiac silhouette is normal in size, midline.  The right hemithorax is well aerated.  No pleural effusion.  The osseous structures appear normal.                               X-Ray Chest PA And Lateral (Final result)  Result time 07/09/19 22:29:47    Final result by Parth Rojas MD (07/09/19 22:29:47)                 Impression:      Moderate left-sided pneumothorax occupying  approximately 20% of the left hemithorax.  No evidence of midline shift.    Case discussed Dr. Wasserman on 07/09/2019 at 22:29 hours.    Electronically signed by resident: Shanice Lam MD  Date:    07/09/2019  Time:    22:18    Electronically signed by: Parth Rojas MD  Date:    07/09/2019  Time:    22:29             Narrative:    EXAMINATION:  XR CHEST PA AND LATERAL    CLINICAL HISTORY:  Dyspnea, unspecified    TECHNIQUE:  PA and lateral views of the chest were performed.    COMPARISON:  None    FINDINGS:  There is a moderate left-sided pneumothorax occupying approximately 20% of the left hemithorax..  Mediastinal structures are midline. No evidence of consolidation or pleural effusion.    The cardiomediastinal silhouette is normal.Bones are intact.  There is no evidence of free air beneath the hemidiaphragms.                                Pending Diagnostic Studies:     None        Final Active Diagnoses:    Diagnosis Date Noted POA    PRINCIPAL PROBLEM:  Pneumothorax [J93.9] 07/10/2019 Yes      Problems Resolved During this Admission:      Discharged Condition: good    Disposition: Home or Self Care    Follow Up:  Follow-up Information     Jose Juan Leary MD On 7/18/2019.    Specialty:  General Surgery  Contact information:  [address]  [address]  [facility] [address]  [phone]                 Patient Instructions: Patient was also given prescription for nicotine patches to use at home.      Ambulatory referral to General Surgery   Referral Priority: Routine Referral Type: Consultation   Referral Reason: Specialty Services Required   Referred to Provider: JOSE JUAN LEARY Requested Specialty: General Surgery   Number of Visits Requested: 1     Diet Adult Regular     Notify your health care provider if you experience any of the following:  difficulty breathing or increased cough     Notify your health care provider if you experience any of the following:  persistent dizziness,  light-headedness, or visual disturbances     Notify your health care provider if you experience any of the following:  increased confusion or weakness     Activity as tolerated     Medications:  Reconciled Home Medications:      Medication List      START taking these medications    nicotine 21 mg/24 hr  Commonly known as:  NICODERM CQ  Place 1 patch onto the skin once daily.  Start taking on:  7/15/2019        CONTINUE taking these medications    ibuprofen 200 MG tablet  Commonly known as:  ADVIL,MOTRIN  Take 200 mg by mouth every 6 (six) hours as needed for Pain.     loratadine 10 mg tablet  Commonly known as:  CLARITIN  Take 10 mg by mouth daily as needed for Allergies.     MELATONIN ORAL  Take 10 mg by mouth nightly as needed.            Holley Elizondo MD   PGY-2  General Surgery  Neuroendocrine Tumor Service  [facility]

## 2019-07-14 NOTE — PLAN OF CARE
Problem: Adult Inpatient Plan of Care  Goal: Plan of Care Review  Outcome: Ongoing (interventions implemented as appropriate)  Pt AAOx3. Pt with minimal complaints of back pain with moderate relief from PRN norco. Pt tolerating regular diet with no complaints. Pt not wearing venti mask at all times, reinforced need. Cardiac monitoring continued. Pt ambulating independently to toilet. Bed locked in lowest position and call bell within reach. Will continue to monitor.

## 2019-07-14 NOTE — PLAN OF CARE
VN reviewed discharge instructions with pt.  AVS printed and handed to pt by bedside nurse.  Reviewed followup appts, medication, diet, and importance of medication compliance.  Reviewed home care instructions, treatment plan, self management and when to seek medical attention.  Allowed time for questions, all questions answered.  Pt verbalized complete understanding of discharge instructions and voices no concerns.      Discharge instructions complete. Pt has prescription for nicotine patch, awaiting friend to arrive. Bedside nurse notified.

## 2019-07-14 NOTE — PROGRESS NOTES
Surgery Progress Note    S:  NAEO. No complaints of pain today No cp/sob.  Resting comfortably. Off of oxygen and tolerating oral intake.     O:  Temp:  [96.7 °F (35.9 °C)-98.4 °F (36.9 °C)] 97.5 °F (36.4 °C)  Pulse:  [60-99] 65  Resp:  [18-22] 22  SpO2:  [100 %] 100 %  BP: ()/(55-70) 98/55    Physical Exam:  Gen: Comfortably resting. Alert and oriented x3.  HEENT: Normocephalic and atraumatic. EOMI.   Resp: Tachypneic, decreased breath sounds on the left, improved.  CV: regular rate   Abd: soft, non-tender  Ext: warm and well perfused  MSK: normal range of motion, normal strength  Neuro: no focal deficits, normal sensation     CXR with continued interval decrease in L pneumothorax       A/P:  19 year old male with recurrent spontaneous L pneumothorax    - CXR with continued decrease of pneumothorax.  Pt declined chest tube placement, even with asha discussion about risks.  Will continue to monitor with daily CXR. CXR improved again today.   - Will ultimately require blebectomy. Will plan for VATS this month if pt is amenable.   - Pain med prn      Dispo: likely today with appointment with Dr. Toledo on Thursday.     Holley Elizondo MD  PGY-2  [employer] General Surgery    Pt cpmfortable, 0xygen sat >97 on roon air  Does not want chest tube  Since confottable will dc home and f/u in office for consideraion for VATS  Risk of recurrence lt sode and new rt pneumo discussed  strog recommendation to quit smoking and erecreational drugs

## 2019-07-15 ENCOUNTER — PATIENT OUTREACH (OUTPATIENT)
Dept: ADMINISTRATIVE | Facility: CLINIC | Age: 19
End: 2019-07-15

## 2019-07-15 NOTE — PATIENT INSTRUCTIONS
Pneumothorax (Collapsed Lung)    A pneumothorax occurs when air fills the space between your lung and chest wall (pleural cavity). This can cause all or part of your lung to collapse. The main cause of a pneumothorax is an injury to the chest cavity that punctures the lungs. Damage may result from a stab or gunshot wound, car accident, fall, or certain surgeries. In some cases, a pneumothorax happens without an obvious cause (spontaneous).   You're more likely to have spontaneous pneumothorax if you smoke or have a chronic lung disease, such as emphysema.   When to go to the emergency room (ER)  Serious pneumothorax can be fatal if not treated. Call 911 for a bad chest wound or any of the following symptoms:  · Sudden, sharp chest pain that may spread to your shoulder or back  · Shortness of breath or trouble breathing  · A bluish color to the skin  · Loss of consciousness or feeling faint with any of the above symptoms  What to expect in the ER  · You will be examined carefully.  · Your lungs and heart will be listened to through a stethoscope.  · You may have X-rays or a CT scan. A CT scan combines X-rays and computer scans to provide detailed pictures of your lungs.  · You will be given help with breathing if you need it.  Treatment  · If the pneumothorax is small, you may stay in the ER for 5 to 6 hours to see if it gets any worse. If it does not get worse, you may be sent home without treatment and told to follow up with your regular doctor.  · If the pneumothorax needs treatment, you will be admitted to the hospital. A healthcare provider may remove the air in your pleural cavity with a needle. Or the provider may place a hollow chest tube in your chest. This tube is attached to a suction device that removes the air. In that case, you will be admitted to the hospital for a few days.  After treatment, you will be told what to do to care for yourself and when to follow up with your doctor.  Date Last Reviewed:  10/1/2016  © 8347-2287 [employer]. [address] [place] [address], [place] [address]. All rights reserved. This information is not intended as a substitute for professional medical care. Always follow your healthcare professional's instructions.

## 2019-07-17 ENCOUNTER — TELEPHONE (OUTPATIENT)
Dept: NEUROLOGY | Facility: HOSPITAL | Age: 19
End: 2019-07-17

## 2019-07-17 ENCOUNTER — PATIENT MESSAGE (OUTPATIENT)
Dept: NEUROLOGY | Facility: HOSPITAL | Age: 19
End: 2019-07-17

## 2019-07-17 NOTE — TELEPHONE ENCOUNTER
Will send the patient a message in the patient portal for an appointment on tomorrow. Also left voice mail for return call.

## 2019-07-17 NOTE — TELEPHONE ENCOUNTER
----- Message from Lina Cadet sent at 7/16/2019  4:21 PM CDT -----  Contact: [phone]/self  DIPIKA  New patient requesting to speak with you regarding scheduling a procedure for this week. Please advise.

## 2019-07-18 ENCOUNTER — OFFICE VISIT (OUTPATIENT)
Dept: NEUROLOGY | Facility: HOSPITAL | Age: 19
End: 2019-07-18
Attending: SURGERY
Payer: COMMERCIAL

## 2019-07-18 ENCOUNTER — HOSPITAL ENCOUNTER (OUTPATIENT)
Dept: RADIOLOGY | Facility: HOSPITAL | Age: 19
Discharge: HOME | End: 2019-07-18
Attending: SURGERY
Payer: COMMERCIAL

## 2019-07-18 VITALS
DIASTOLIC BLOOD PRESSURE: 68 MMHG | WEIGHT: 119.25 LBS | HEIGHT: 71 IN | TEMPERATURE: 98 F | SYSTOLIC BLOOD PRESSURE: 104 MMHG | BODY MASS INDEX: 16.69 KG/M2 | HEART RATE: 71 BPM | OXYGEN SATURATION: 100 %

## 2019-07-18 DIAGNOSIS — J93.11 PRIMARY SPONTANEOUS PNEUMOTHORAX: ICD-10-CM

## 2019-07-18 DIAGNOSIS — J93.11 PRIMARY SPONTANEOUS PNEUMOTHORAX: Primary | ICD-10-CM

## 2019-07-18 PROCEDURE — 71046 X-RAY EXAM CHEST 2 VIEWS: CPT | Mod: 26,,, | Performed by: RADIOLOGY

## 2019-07-18 PROCEDURE — 71046 X-RAY EXAM CHEST 2 VIEWS: CPT | Mod: TC,FY

## 2019-07-18 PROCEDURE — 99213 OFFICE O/P EST LOW 20 MIN: CPT | Performed by: SURGERY

## 2019-07-18 PROCEDURE — 71046 XR CHEST PA AND LATERAL: ICD-10-PCS | Mod: 26,,, | Performed by: RADIOLOGY

## 2019-07-18 NOTE — PATIENT INSTRUCTIONS
Chest Xray scheduled today, 1st floor [facility]    4 week follow up scheduled on Thursday, August 15, 2019 at 815am.

## 2019-07-18 NOTE — PROGRESS NOTES
"NOLANETS:  [facility]  A collaboration between [facility] and [facility]      PATIENT: Radhames Garrison  MRN: [id number]  DATE: 7/18/2019    Subjective:      Chief Complaint: Hospital Follow Up (follow up)   Was admitted to the hospital last week with spontaneous pneumothorax on the left side. This is the 2nd time recurrence.  He did not want to have a chest tube.  The pneumothorax gradually resolved and therefore he was discharged.  He was recommended to have surgery which he is agreeable    was discharged on Saturday  No major issues   Still sore on back of left chest    Stopped smoking    Vitals:   Vitals:    07/18/19 0851   BP: 104/68   BP Location: Left arm   Patient Position: Sitting   BP Method: Medium (Automatic)   Pulse: 71   Temp: 98.4 °F (36.9 °C)   TempSrc: Oral   SpO2: 100%   Weight: 54.1 kg (119 lb 4.3 oz)   Height: 5' 11" (1.803 m)        Karnofsky Score:     Diagnosis: No diagnosis found.     Oncologic History:     Interval History:     Past Medical History:  Past Medical History:   Diagnosis Date    Depression     History of psychiatric care     History of psychiatric hospitalization     Pneumothorax        Past Surgical History:  History reviewed. No pertinent surgical history.    Family History:  History reviewed. No pertinent family history.    Allergies:  Review of patient's allergies indicates:  No Known Allergies    Medications:  Current Outpatient Medications   Medication Sig Dispense Refill    ibuprofen (ADVIL,MOTRIN) 200 MG tablet Take 200 mg by mouth every 6 (six) hours as needed for Pain.      loratadine (CLARITIN) 10 mg tablet Take 10 mg by mouth daily as needed for Allergies.      MELATONIN ORAL Take 10 mg by mouth nightly as needed.      nicotine (NICODERM CQ) 21 mg/24 hr Place 1 patch onto the skin once daily. 28 patch 1     No current facility-administered medications for this visit.  "       Review of Systems   Constitutional: Negative for appetite change, chills, diaphoresis, fatigue, fever and unexpected weight change.   HENT: Negative for congestion, dental problem, drooling, ear pain, facial swelling, hearing loss, mouth sores, nosebleeds, postnasal drip, rhinorrhea, sinus pressure and sinus pain.    Eyes: Negative for photophobia, pain, discharge, redness, itching and visual disturbance.   Respiratory: Positive for chest tightness and shortness of breath. Negative for apnea, choking, wheezing and stridor.    Cardiovascular: Positive for chest pain. Negative for palpitations and leg swelling.   Gastrointestinal: Negative for abdominal pain, anal bleeding, diarrhea, nausea, rectal pain and vomiting.   Endocrine: Negative.    Genitourinary: Negative.    Musculoskeletal: Negative for arthralgias, back pain, gait problem, joint swelling, myalgias and neck stiffness.   Skin: Negative for pallor, rash and wound.   Allergic/Immunologic: Negative.    Neurological: Negative for tremors, syncope, facial asymmetry, weakness, light-headedness and headaches.   Hematological: Negative.    Psychiatric/Behavioral: Negative for agitation, behavioral problems, confusion, decreased concentration and dysphoric mood.      Objective:      Physical Exam   Constitutional: He is oriented to person, place, and time. He appears well-developed and well-nourished.   HENT:   Head: Normocephalic.   Right Ear: External ear normal.   Left Ear: External ear normal.   Eyes: Pupils are equal, round, and reactive to light. EOM are normal.   Neck: Normal range of motion. Neck supple.   Cardiovascular: Normal rate and regular rhythm.   Pulmonary/Chest: Effort normal and breath sounds normal.   Abdominal: Soft. Bowel sounds are normal. He exhibits no distension and no mass. There is no tenderness. There is no guarding. No hernia.   Musculoskeletal: Normal range of motion.   Neurological: He is alert and oriented to person, place,  and time.   Skin: Skin is warm.   Psychiatric: His behavior is normal.      Assessment:       No diagnosis found.    Laboratory Data:   Results for CARLITO BALES (MRN [id number]) as of 7/18/2019 09:28   Ref. Range 7/9/2019 22:36 7/10/2019 07:45   WBC Latest Ref Range: 3.90 - 12.70 K/uL 5.30    RBC Latest Ref Range: 4.60 - 6.20 M/uL 4.81    Hemoglobin Latest Ref Range: 14.0 - 18.0 g/dL 14.2    Hematocrit Latest Ref Range: 40.0 - 54.0 % 41.7    MCV Latest Ref Range: 82 - 98 fL 87    MCH Latest Ref Range: 27.0 - 31.0 pg 29.5    MCHC Latest Ref Range: 32.0 - 36.0 g/dL 34.1    RDW Latest Ref Range: 11.5 - 14.5 % 12.3    Platelets Latest Ref Range: 150 - 350 K/uL 208    MPV Latest Ref Range: 9.2 - 12.9 fL 10.7    Gran% Latest Ref Range: 38.0 - 73.0 % 56.9    Gran # (ANC) Latest Ref Range: 1.8 - 7.7 K/uL 3.0    Lymph% Latest Ref Range: 18.0 - 48.0 % 28.9    Lymph # Latest Ref Range: 1.0 - 4.8 K/uL 1.5    Mono% Latest Ref Range: 4.0 - 15.0 % 11.9    Mono # Latest Ref Range: 0.3 - 1.0 K/uL 0.6    Eosinophil% Latest Ref Range: 0.0 - 8.0 % 2.1    Eos # Latest Ref Range: 0.0 - 0.5 K/uL 0.1    Basophil% Latest Ref Range: 0.0 - 1.9 % 0.2    Baso # Latest Ref Range: 0.00 - 0.20 K/uL 0.01    Differential Method Unknown Automated    Sodium Latest Ref Range: 136 - 145 mmol/L 142    Potassium Latest Ref Range: 3.5 - 5.1 mmol/L 3.7    Chloride Latest Ref Range: 95 - 110 mmol/L 108    CO2 Latest Ref Range: 23 - 29 mmol/L 26    Anion Gap Latest Ref Range: 8 - 16 mmol/L 8    BUN, Bld Latest Ref Range: 6 - 20 mg/dL 19    Creatinine Latest Ref Range: 0.5 - 1.4 mg/dL 0.9    eGFR if non African American Latest Ref Range: >60 mL/min/1.73 m^2 >60    eGFR if  Latest Ref Range: >60 mL/min/1.73 m^2 >60    Glucose Latest Ref Range: 70 - 110 mg/dL 70    Calcium Latest Ref Range: 8.7 - 10.5 mg/dL 9.8    Alkaline Phosphatase Latest Ref Range: 55 - 135 U/L 69    PROTEIN TOTAL Latest Ref Range: 6.0 - 8.4 g/dL 6.8    Albumin Latest Ref  Range: 3.5 - 5.2 g/dL 4.4    BILIRUBIN TOTAL Latest Ref Range: 0.1 - 1.0 mg/dL 1.2 (H)    AST Latest Ref Range: 10 - 40 U/L 16    ALT Latest Ref Range: 10 - 44 U/L 16    POCT      PACS Images for [facility] [place] Viewer (Includes [facility] and [place] [facility] Images)      Show images for CT Chest Without Contrast   Contains abnormal data CT Chest Without Contrast   Order: [id number]   Status:  Final result   Visible to patient:  Yes (Patient Portal) Next appt:  08/15/2019 at 11:30 AM in Neuroendocrine (Paris Leary MD)   Details     Reading Physician Reading Date Result Priority   Adrianna Sheppard MD 7/10/2019       Narrative     EXAMINATION:  CT CHEST WITHOUT CONTRAST    CLINICAL HISTORY:  bleb disease, spontaneous pneumo;    TECHNIQUE:  Contiguous axial 5 mm images was obtained from the lung apices through the lung bases.  No intravenous contrast was given.  Coronal and sagittal reformatted images were provided.    COMPARISON:  07/10/2019 at 12:34    FINDINGS:  There is 30-40% left-sided pneumothorax, which does not significantly appear to be changed when compared to the previous examination.  A bleb is seen at the right lung apex with mild right apical scarring.    There is no evidence of mediastinal, hilar, or axillary adenopathy.    There is trace left pleural effusion.  There is no significant pericardial effusion.    The heart size is within normal limits.    There are no displaced left-sided rib fractures.  Mild scoliosis is seen with convexity to the right or splinting.      Impression       Relatively stable 30 40% left-sided pneumothorax.  Trace left pleural effusion.    This report was flagged in Epic as abnormal.      Electronically signed by: Adrianna Sheppard  Date: 07/10/2019  Time: 18:06             Last Resulted: 07/10/19 18:06 Order Details View Encounter Lab and Collection Details Routing Result History            External Result Report     External Result Report   Narrative      EXAMINATION:  CT CHEST WITHOUT CONTRAST        Glucose Latest Ref Range: 70 - 110 mg/dL  74       Impression:  Recurrent left pneumothorax on the left side with the bleb on the right apex.  He had  conservative management while he was in the hospital as he refused to have intercostal tube however the pneumothorax gradually responded    He complains of soreness on the backside of the left chest otherwise feeling okay occasional shortness of breath.  Is doing well overall eating well and having normal activities.    I strongly recommended to quit smoking and use of any recreational medications.    Since he looks stable overall will obtain a chest x-ray.  If he shows any amount of pneumothorax he needs to consider having video-assisted thoracoscopic surgery as soon as possible.  However the pneumothorax is completely resolved I talked him about the chance of his recurrence on the left side and the possibly of a new 1 on the right side as he has a bleb on the right side.  briefly talked about the surgery the hospitalization time.  I emphasized that he still has a small chance of recurrence in spite of the surgery.     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  He would prefer to go back to work.  Based on x-ray will plan if there is no pneumothorax then will see me in 4 weeks time and reassess  Plan:        stat chest x-ray today  If pneumo will need surgery soon.  Will schedule for VATS with pleurodesis next week if not symptomatic and if it is symptomatic will admit him place a chest tube and plan for surgery    If he has no pneumothorax will recommend elective video cystourethroscopy surgery with the pleurodesis on the left side.  His right side a PET shows some blebs he also has a risk of pneumothorax on the right side however will not require any prophylactic surgery. Next by talked about the surgery the risk involved surgeries is bleeding infection DVT PE MI stay in the hospital recurrent pneumothorax up to about  3-4% in spite of surgery having chest tube for a longer time.  Emphasized the importance of quitting smoking and use of recreational drugs as they would increase the chance of having pneumothorax.    Also discussed at any point he develops any similar chest pain he needs to come to the ER immediately so so that he can be assessed and intervened accordingly    Patient understands the implications comprehends his diagnosis and condition.                  ESSENCE Toledo MD, FACS   Associate Professor of Surgery, [facility]   Neuroendocrine Surgery, Hepatic/Pancreatic & General Surgery   [address], [address]   [place] [address]   ph. [phone]; [phone]   fax. [phone]

## 2019-07-22 ENCOUNTER — TELEPHONE (OUTPATIENT)
Dept: NEUROLOGY | Facility: HOSPITAL | Age: 19
End: 2019-07-22

## 2019-07-22 NOTE — TELEPHONE ENCOUNTER
----- Message from Juanita Brown sent at 7/22/2019 11:35 AM CDT -----  Contact: Ericka with [facility] - [phone] ext [phone]  JPB:  is requesting a call back regarding, patients appointments. Please advise

## 2019-08-15 ENCOUNTER — HOSPITAL ENCOUNTER (OUTPATIENT)
Dept: RADIOLOGY | Facility: HOSPITAL | Age: 19
Discharge: HOME | End: 2019-08-15
Attending: SURGERY
Payer: COMMERCIAL

## 2019-08-15 ENCOUNTER — OFFICE VISIT (OUTPATIENT)
Dept: NEUROLOGY | Facility: HOSPITAL | Age: 19
End: 2019-08-15
Attending: SURGERY
Payer: COMMERCIAL

## 2019-08-15 VITALS
HEIGHT: 71 IN | TEMPERATURE: 98 F | BODY MASS INDEX: 17.19 KG/M2 | HEART RATE: 74 BPM | WEIGHT: 122.81 LBS | SYSTOLIC BLOOD PRESSURE: 107 MMHG | DIASTOLIC BLOOD PRESSURE: 69 MMHG

## 2019-08-15 DIAGNOSIS — G89.29 CHRONIC BILATERAL LOW BACK PAIN, WITH SCIATICA PRESENCE UNSPECIFIED: ICD-10-CM

## 2019-08-15 DIAGNOSIS — J93.9 PNEUMOTHORAX ON LEFT: ICD-10-CM

## 2019-08-15 DIAGNOSIS — J93.9 PNEUMOTHORAX ON LEFT: Primary | ICD-10-CM

## 2019-08-15 DIAGNOSIS — M54.5 CHRONIC BILATERAL LOW BACK PAIN, WITH SCIATICA PRESENCE UNSPECIFIED: ICD-10-CM

## 2019-08-15 DIAGNOSIS — M54.9 BACK PAIN, UNSPECIFIED BACK LOCATION, UNSPECIFIED BACK PAIN LATERALITY, UNSPECIFIED CHRONICITY: ICD-10-CM

## 2019-08-15 PROCEDURE — 71046 X-RAY EXAM CHEST 2 VIEWS: CPT | Mod: TC,FY

## 2019-08-15 PROCEDURE — 99213 OFFICE O/P EST LOW 20 MIN: CPT | Mod: 25 | Performed by: SURGERY

## 2019-08-15 PROCEDURE — 71046 X-RAY EXAM CHEST 2 VIEWS: CPT | Mod: 26,,, | Performed by: RADIOLOGY

## 2019-08-15 PROCEDURE — 71046 XR CHEST PA AND LATERAL: ICD-10-PCS | Mod: 26,,, | Performed by: RADIOLOGY

## 2019-08-15 NOTE — PATIENT INSTRUCTIONS
3 month follow up scheduled on Thursday, November 14, 2019 at 1115am    MRI scheduled on Monday, August 19, 2019 at 4pm.    Chest Xray today, [facility], 1st floor [facility]

## 2019-08-15 NOTE — PROGRESS NOTES
"NOLANETS:  [facility]  A collaboration between [facility] and [facility]      PATIENT: Radhames Garrison  MRN: [id number]  DATE: 8/15/2019    Subjective:      Chief Complaint: Follow-up    He was admitted to the hospital with a spontaneous pneumothorax 2nd time.  He did not require a chest tube was conservatively managed.  He started gradually feel better and was discharged during his follow-up his chest x-ray did not show any pneumothorax.  During the hospital stay as well as in the clinic I talked about the need for video-assisted thoracoscopic surgery. A during the hospital he also had a CT of the chest which shows a bleb on the right side and I also had discussed about the risk of having the same problem on the right side.    c/o dull aching pain left chest  Has cut down work   Otherwise doing ok    C/o back ain    Says he had sevre disc issues was told by his chiropracter . Has had scans before, does not remember  Vitals:   Vitals:    08/15/19 1153   BP: 107/69   Pulse: 74   Temp: 97.6 °F (36.4 °C)   Weight: 55.7 kg (122 lb 12.7 oz)   Height: 5' 11" (1.803 m)        ECOG Score:     Diagnosis: No diagnosis found.     Oncologic History:     Interval History:     Past Medical History:  Past Medical History:   Diagnosis Date    Depression     History of psychiatric care     History of psychiatric hospitalization     Pneumothorax        Past Surgical History:  History reviewed. No pertinent surgical history.    Family History:  History reviewed. No pertinent family history.    Allergies:  Review of patient's allergies indicates:  No Known Allergies    Medications:   Current Outpatient Medications   Medication Sig    ibuprofen (ADVIL,MOTRIN) 200 MG tablet Take 200 mg by mouth every 6 (six) hours as needed for Pain.    loratadine (CLARITIN) 10 mg tablet Take 10 mg by mouth daily as needed for Allergies.    MELATONIN ORAL " Take 10 mg by mouth nightly as needed.    nicotine (NICODERM CQ) 21 mg/24 hr Place 1 patch onto the skin once daily.     No current facility-administered medications for this visit.         Review of Systems   Constitutional: Negative for activity change, appetite change, chills, diaphoresis, fatigue, fever and unexpected weight change.   HENT: Negative for congestion, dental problem, drooling, ear pain, mouth sores, nosebleeds, postnasal drip, rhinorrhea, sinus pressure and sinus pain.    Eyes: Negative for photophobia, pain, redness, itching and visual disturbance.   Respiratory: Positive for chest tightness and shortness of breath. Negative for apnea, cough, choking, wheezing and stridor.    Cardiovascular: Negative for chest pain, palpitations and leg swelling.   Gastrointestinal: Negative for abdominal distention, constipation, diarrhea and nausea.   Endocrine: Negative.    Genitourinary: Negative.    Musculoskeletal: Negative for back pain, gait problem and myalgias.   Skin: Negative for color change and pallor.   Allergic/Immunologic: Negative.    Neurological: Negative.  Negative for facial asymmetry, numbness and headaches.   Hematological: Does not bruise/bleed easily.   Psychiatric/Behavioral: Negative for agitation and behavioral problems.      Objective:      Physical Exam   Constitutional: He is oriented to person, place, and time. He appears well-developed and well-nourished.   HENT:   Head: Normocephalic and atraumatic.   Right Ear: External ear normal.   Left Ear: External ear normal.   Eyes: Pupils are equal, round, and reactive to light. Conjunctivae are normal.   Neck: Normal range of motion.   Cardiovascular: Normal rate and regular rhythm.   Pulmonary/Chest: Effort normal and breath sounds normal.   Abdominal: Soft. Bowel sounds are normal. There is no rebound. No hernia.   Musculoskeletal: Normal range of motion.   Neurological: He is alert and oriented to person, place, and time.   Skin:  Skin is warm and dry.   Psychiatric: He has a normal mood and affect. His behavior is normal.      Assessment:       No diagnosis found.    Laboratory Data:   Results for orders placed or performed during the hospital encounter of 07/09/19   Comprehensive metabolic panel   Result Value Ref Range    Sodium 142 136 - 145 mmol/L    Potassium 3.7 3.5 - 5.1 mmol/L    Chloride 108 95 - 110 mmol/L    CO2 26 23 - 29 mmol/L    Glucose 70 70 - 110 mg/dL    BUN, Bld 19 6 - 20 mg/dL    Creatinine 0.9 0.5 - 1.4 mg/dL    Calcium 9.8 8.7 - 10.5 mg/dL    Total Protein 6.8 6.0 - 8.4 g/dL    Albumin 4.4 3.5 - 5.2 g/dL    Total Bilirubin 1.2 (H) 0.1 - 1.0 mg/dL    Alkaline Phosphatase 69 55 - 135 U/L    AST 16 10 - 40 U/L    ALT 16 10 - 44 U/L    Anion Gap 8 8 - 16 mmol/L    eGFR if African American >60 >60 mL/min/1.73 m^2    eGFR if non African American >60 >60 mL/min/1.73 m^2   CBC auto differential   Result Value Ref Range    WBC 5.30 3.90 - 12.70 K/uL    RBC 4.81 4.60 - 6.20 M/uL    Hemoglobin 14.2 14.0 - 18.0 g/dL    Hematocrit 41.7 40.0 - 54.0 %    Mean Corpuscular Volume 87 82 - 98 fL    Mean Corpuscular Hemoglobin 29.5 27.0 - 31.0 pg    Mean Corpuscular Hemoglobin Conc 34.1 32.0 - 36.0 g/dL    RDW 12.3 11.5 - 14.5 %    Platelets 208 150 - 350 K/uL    MPV 10.7 9.2 - 12.9 fL    Gran # (ANC) 3.0 1.8 - 7.7 K/uL    Lymph # 1.5 1.0 - 4.8 K/uL    Mono # 0.6 0.3 - 1.0 K/uL    Eos # 0.1 0.0 - 0.5 K/uL    Baso # 0.01 0.00 - 0.20 K/uL    Gran% 56.9 38.0 - 73.0 %    Lymph% 28.9 18.0 - 48.0 %    Mono% 11.9 4.0 - 15.0 %    Eosinophil% 2.1 0.0 - 8.0 %    Basophil% 0.2 0.0 - 1.9 %    Differential Method Automated    POCT glucose   Result Value Ref Range    POCT Glucose 74 70 - 110 mg/dL        Impression:  Spontaneous pneumothorax a 2nd time on the left side conservatively managed fully resolved.  He has residual pain over the back side however on detailed questioning he has been having some back problem years back.  He has told that he has  significant disc disease.  Since he is 19 I am not sure about the veracity of that statement    Since he is doing well overall I will order for chest x-ray.  If there is no pneumothorax will watch conservatively and if he gets any more episode then will plan for video-assisted thoracoscopic surgery with apical blebectomy.  If the chest x-ray does not show any issues I will follow him up in about 3 months time on he can come to the ER and see me if he has any symptoms.    Will also obtain a MRI of the lower back as that is where his main problems are to rule out any spine issues    Plan:       Chest x-ray today  MRI lumbar spine to rule out any disc disease  Follow-up with me in 3 months time  Importance of quitting smoking and recreational marijuana discussed  To call me or come to ER if he develops any shortness of breath any pain on the left or the right side        Chest x-ray reviewed no pneumothorax will follow up in 3 months time  If the MRI lumbar spine shows any gross abnormality will refer to appropriate consultant          Left a message in his mobile phone of about the chest x-ray findings and also to call back after the MRI is done to verify the the results and to give him directions after the MRI      ESSENCE Toledo MD, FACS   Associate Professor of Surgery, [facility]   Neuroendocrine Surgery, Hepatic/Pancreatic & General Surgery   [address]   [place] [address]   ph. [phone]; [phone]   fax. [phone]

## 2020-10-31 ENCOUNTER — OFFICE VISIT (OUTPATIENT)
Dept: URGENT CARE | Facility: CLINIC | Age: 20
End: 2020-10-31
Payer: COMMERCIAL

## 2020-10-31 VITALS
WEIGHT: 122.81 LBS | RESPIRATION RATE: 18 BRPM | TEMPERATURE: 98 F | BODY MASS INDEX: 17.19 KG/M2 | HEIGHT: 71 IN | HEART RATE: 86 BPM | DIASTOLIC BLOOD PRESSURE: 79 MMHG | SYSTOLIC BLOOD PRESSURE: 118 MMHG | OXYGEN SATURATION: 98 %

## 2020-10-31 DIAGNOSIS — R05.9 COUGH: ICD-10-CM

## 2020-10-31 DIAGNOSIS — J06.9 VIRAL URI WITH COUGH: Primary | ICD-10-CM

## 2020-10-31 LAB
CTP QC/QA: YES
SARS-COV-2 RDRP RESP QL NAA+PROBE: NEGATIVE

## 2020-10-31 PROCEDURE — U0002: ICD-10-PCS | Mod: QW,S$GLB,, | Performed by: PHYSICIAN ASSISTANT

## 2020-10-31 PROCEDURE — 99204 PR OFFICE/OUTPT VISIT, NEW, LEVL IV, 45-59 MIN: ICD-10-PCS | Mod: S$GLB,,, | Performed by: PHYSICIAN ASSISTANT

## 2020-10-31 PROCEDURE — U0002 COVID-19 LAB TEST NON-CDC: HCPCS | Mod: QW,S$GLB,, | Performed by: PHYSICIAN ASSISTANT

## 2020-10-31 PROCEDURE — 99204 OFFICE O/P NEW MOD 45 MIN: CPT | Mod: S$GLB,,, | Performed by: PHYSICIAN ASSISTANT

## 2020-10-31 NOTE — PROGRESS NOTES
"Subjective:       Patient ID: Radhames Garrison is a 20 y.o. male.    Vitals:  height is 5' 11" (1.803 m) and weight is 55.7 kg (122 lb 12.7 oz). His temperature is 98.2 °F (36.8 °C). His blood pressure is 118/79 and his pulse is 86. His respiration is 18 and oxygen saturation is 98%.     Chief Complaint: COVID-19 Concerns    Patient presenting with cough and congestion that started yesterday. States that he went to the family birthday party about 1 week ago and recently found out that a couple of the attendees tested positive for COVID. Requesting COVID testing today because he lives at home with his mother who is currently immunocompromised. He's been penny Sudafed with mild relief.    Cough  This is a new problem. The current episode started yesterday. The problem has been unchanged. The problem occurs constantly. The cough is non-productive. Associated symptoms include nasal congestion and a sore throat. Pertinent negatives include no chest pain, chills, fever, headaches, myalgias, rash or shortness of breath. Treatments tried: Sudafed. There is no history of asthma, bronchitis, COPD or emphysema.       Constitution: Negative for chills, sweating, fatigue and fever.   HENT: Positive for congestion and sore throat.    Neck: Negative for painful lymph nodes.   Cardiovascular: Negative for chest pain and leg swelling.   Eyes: Negative for double vision and blurred vision.   Respiratory: Positive for cough. Negative for sputum production and shortness of breath.    Gastrointestinal: Negative for nausea, vomiting and diarrhea.   Genitourinary: Negative for dysuria, frequency and urgency.   Musculoskeletal: Negative for joint pain, joint swelling, muscle cramps and muscle ache.   Skin: Negative for color change, pale and rash.   Allergic/Immunologic: Negative for seasonal allergies.   Neurological: Negative for dizziness, history of vertigo, light-headedness, passing out and headaches.   Hematologic/Lymphatic: Negative " for swollen lymph nodes, easy bruising/bleeding and history of blood clots. Does not bruise/bleed easily.   Psychiatric/Behavioral: Negative for nervous/anxious, sleep disturbance and depression. The patient is not nervous/anxious.        Objective:      Physical Exam   Constitutional: He is oriented to person, place, and time. He appears well-developed. He is cooperative.  Non-toxic appearance. He does not appear ill. No distress.   HENT:   Head: Normocephalic and atraumatic.   Ears:   Right Ear: Hearing, tympanic membrane, external ear and ear canal normal.   Left Ear: Hearing, tympanic membrane, external ear and ear canal normal.   Nose: Mucosal edema and rhinorrhea present. No nasal deformity. No epistaxis. Right sinus exhibits no maxillary sinus tenderness and no frontal sinus tenderness. Left sinus exhibits no maxillary sinus tenderness and no frontal sinus tenderness.   Mouth/Throat: Uvula is midline and mucous membranes are normal. No trismus in the jaw. Normal dentition. No uvula swelling. Posterior oropharyngeal erythema and cobblestoning present. No oropharyngeal exudate or posterior oropharyngeal edema.   Eyes: Conjunctivae and lids are normal. No scleral icterus.   Neck: Trachea normal, full passive range of motion without pain and phonation normal. Neck supple. No neck rigidity. No edema and no erythema present.   Cardiovascular: Normal rate, regular rhythm, normal heart sounds and normal pulses.   Pulmonary/Chest: Effort normal and breath sounds normal. No respiratory distress. He has no decreased breath sounds. He has no wheezes. He has no rhonchi.   Abdominal: Normal appearance.   Musculoskeletal: Normal range of motion.         General: No deformity.   Neurological: He is alert and oriented to person, place, and time. He exhibits normal muscle tone. Coordination normal.   Skin: Skin is warm, dry, intact, not diaphoretic and not pale. Psychiatric: His speech is normal and behavior is normal.  Judgment and thought content normal.   Nursing note and vitals reviewed.        Recent Results (from the past 48 hour(s))   POCT COVID-19 Rapid Screening    Collection Time: 10/31/20  1:21 PM   Result Value Ref Range    POC Rapid COVID Negative Negative     Acceptable Yes    ]    Assessment:       1. Viral URI with cough    2. Cough        Plan:         Viral URI with cough    Cough  -     POCT COVID-19 Rapid Screening         Patient Instructions   General Instructions for Viral URI:    Below are suggestions for symptomatic relief:              -Tylenol every 4 hours OR ibuprofen every 6 hours as needed for pain/fever.              -Salt water gargles to soothe throat pain.              -Chloroseptic spray also helps to numb throat pain.              -Nasal saline spray reduces inflammation and dryness.              -Warm face compresses to help with facial sinus pain/pressure.              -Vicks vapor rub at night.              -Flonase OTC or Nasacort OTC for nasal congestion.              -Simple foods like chicken noodle soup.              -Delsym helps with coughing at night              -Zyrtec/Claritin during the day & Benadryl at night may help with allergies.                If you DO NOT have Hypertension or any history of palpitations, it is ok to take over the counter Sudafed or Mucinex D or Allegra-D or Claritin-D or Zyrtec-D.  If you do take one of the above, it is ok to combine that with plain over the counter Mucinex or Allegra or Claritin or Zyrtec. If, for example, you are taking Zyrtec -D, you can combine that with Mucinex, but not Mucinex-D.  If you are taking Mucinex-D, you can combine that with plain Allegra or Claritin or Zyrtec.   If you DO have Hypertension or palpitations, it is safe to take Coricidin HBP for relief of sinus symptoms.     Please follow up with your primary care provider within 2-5 days if your signs and symptoms have not resolved or worsen.      If your  condition worsens or fails to improve we recommend that you receive another evaluation at the emergency room immediately or contact your primary medical clinic to discuss your concerns.   You must understand that you have received an Urgent Care treatment only and that you may be released before all of your medical problems are known or treated. You, the patient, will arrange for follow up care as instructed.       Viral Upper Respiratory Illness (Adult)  You have a viral upper respiratory illness (URI), which is another term for the common cold. This illness is contagious during the first few days. It is spread through the air by coughing and sneezing. It may also be spread by direct contact (touching the sick person and then touching your own eyes, nose, or mouth). Frequent handwashing will decrease risk of spread. Most viral illnesses go away within 7 to 10 days with rest and simple home remedies. Sometimes the illness may last for several weeks. Antibiotics will not kill a virus, and they are generally not prescribed for this condition.    Home care  · If symptoms are severe, rest at home for the first 2 to 3 days. When you resume activity, don't let yourself get too tired.  · Avoid being exposed to cigarette smoke (yours or others).  · You may use acetaminophen or ibuprofen to control pain and fever, unless another medicine was prescribed. (Note: If you have chronic liver or kidney disease, have ever had a stomach ulcer or gastrointestinal bleeding, or are taking blood-thinning medicines, talk with your healthcare provider before using these medicines.) Aspirin should never be given to anyone under 18 years of age who is ill with a viral infection or fever. It may cause severe liver or brain damage.  · Your appetite may be poor, so a light diet is fine. Avoid dehydration by drinking 6 to 8 glasses of fluids per day (water, soft drinks, juices, tea, or soup). Extra fluids will help loosen secretions in the nose  and lungs.  · Over-the-counter cold medicines will not shorten the length of time youre sick, but they may be helpful for the following symptoms: cough, sore throat, and nasal and sinus congestion. (Note: Do not use decongestants if you have high blood pressure.)  Follow-up care  Follow up with your healthcare provider, or as advised.  When to seek medical advice  Call your healthcare provider right away if any of these occur:  · Cough with lots of colored sputum (mucus)  · Severe headache; face, neck, or ear pain  · Difficulty swallowing due to throat pain  · Fever of 100.4°F (38°C)  Call 911, or get immediate medical care  Call emergency services right away if any of these occur:  · Chest pain, shortness of breath, wheezing, or difficulty breathing  · Coughing up blood  · Inability to swallow due to throat pain  Date Last Reviewed: 9/13/2015  © 2514-9272 [employer]. [address] [place] [address], [place] [address]. All rights reserved. This information is not intended as a substitute for professional medical care. Always follow your healthcare professional's instructions.

## 2020-10-31 NOTE — PATIENT INSTRUCTIONS
General Instructions for Viral URI:    Below are suggestions for symptomatic relief:              -Tylenol every 4 hours OR ibuprofen every 6 hours as needed for pain/fever.              -Salt water gargles to soothe throat pain.              -Chloroseptic spray also helps to numb throat pain.              -Nasal saline spray reduces inflammation and dryness.              -Warm face compresses to help with facial sinus pain/pressure.              -Vicks vapor rub at night.              -Flonase OTC or Nasacort OTC for nasal congestion.              -Simple foods like chicken noodle soup.              -Delsym helps with coughing at night              -Zyrtec/Claritin during the day & Benadryl at night may help with allergies.                If you DO NOT have Hypertension or any history of palpitations, it is ok to take over the counter Sudafed or Mucinex D or Allegra-D or Claritin-D or Zyrtec-D.  If you do take one of the above, it is ok to combine that with plain over the counter Mucinex or Allegra or Claritin or Zyrtec. If, for example, you are taking Zyrtec -D, you can combine that with Mucinex, but not Mucinex-D.  If you are taking Mucinex-D, you can combine that with plain Allegra or Claritin or Zyrtec.   If you DO have Hypertension or palpitations, it is safe to take Coricidin HBP for relief of sinus symptoms.     Please follow up with your primary care provider within 2-5 days if your signs and symptoms have not resolved or worsen.      If your condition worsens or fails to improve we recommend that you receive another evaluation at the emergency room immediately or contact your primary medical clinic to discuss your concerns.   You must understand that you have received an Urgent Care treatment only and that you may be released before all of your medical problems are known or treated. You, the patient, will arrange for follow up care as instructed.       Viral Upper Respiratory Illness (Adult)  You have a  viral upper respiratory illness (URI), which is another term for the common cold. This illness is contagious during the first few days. It is spread through the air by coughing and sneezing. It may also be spread by direct contact (touching the sick person and then touching your own eyes, nose, or mouth). Frequent handwashing will decrease risk of spread. Most viral illnesses go away within 7 to 10 days with rest and simple home remedies. Sometimes the illness may last for several weeks. Antibiotics will not kill a virus, and they are generally not prescribed for this condition.    Home care  · If symptoms are severe, rest at home for the first 2 to 3 days. When you resume activity, don't let yourself get too tired.  · Avoid being exposed to cigarette smoke (yours or others).  · You may use acetaminophen or ibuprofen to control pain and fever, unless another medicine was prescribed. (Note: If you have chronic liver or kidney disease, have ever had a stomach ulcer or gastrointestinal bleeding, or are taking blood-thinning medicines, talk with your healthcare provider before using these medicines.) Aspirin should never be given to anyone under 18 years of age who is ill with a viral infection or fever. It may cause severe liver or brain damage.  · Your appetite may be poor, so a light diet is fine. Avoid dehydration by drinking 6 to 8 glasses of fluids per day (water, soft drinks, juices, tea, or soup). Extra fluids will help loosen secretions in the nose and lungs.  · Over-the-counter cold medicines will not shorten the length of time youre sick, but they may be helpful for the following symptoms: cough, sore throat, and nasal and sinus congestion. (Note: Do not use decongestants if you have high blood pressure.)  Follow-up care  Follow up with your healthcare provider, or as advised.  When to seek medical advice  Call your healthcare provider right away if any of these occur:  · Cough with lots of colored sputum  (mucus)  · Severe headache; face, neck, or ear pain  · Difficulty swallowing due to throat pain  · Fever of 100.4°F (38°C)  Call 911, or get immediate medical care  Call emergency services right away if any of these occur:  · Chest pain, shortness of breath, wheezing, or difficulty breathing  · Coughing up blood  · Inability to swallow due to throat pain  Date Last Reviewed: 9/13/2015  © 3087-1944 [employer]. [address] [place] [address], [place] [address]. All rights reserved. This information is not intended as a substitute for professional medical care. Always follow your healthcare professional's instructions.

## 2021-02-09 ENCOUNTER — OFFICE VISIT (OUTPATIENT)
Dept: PULMONOLOGY | Facility: CLINIC | Age: 21
End: 2021-02-09
Payer: COMMERCIAL

## 2021-02-09 ENCOUNTER — LAB VISIT (OUTPATIENT)
Dept: INTERNAL MEDICINE | Facility: CLINIC | Age: 21
End: 2021-02-09
Payer: COMMERCIAL

## 2021-02-09 ENCOUNTER — TELEPHONE (OUTPATIENT)
Dept: PULMONOLOGY | Facility: CLINIC | Age: 21
End: 2021-02-09

## 2021-02-09 VITALS
BODY MASS INDEX: 17.93 KG/M2 | WEIGHT: 128.06 LBS | HEART RATE: 75 BPM | OXYGEN SATURATION: 100 % | DIASTOLIC BLOOD PRESSURE: 82 MMHG | HEIGHT: 71 IN | SYSTOLIC BLOOD PRESSURE: 118 MMHG

## 2021-02-09 DIAGNOSIS — F43.21 ADJUSTMENT DISORDER WITH DEPRESSED MOOD: ICD-10-CM

## 2021-02-09 DIAGNOSIS — R05.9 COUGH: ICD-10-CM

## 2021-02-09 DIAGNOSIS — R06.09 DYSPNEA ON EXERTION: ICD-10-CM

## 2021-02-09 DIAGNOSIS — M54.50 CHRONIC MIDLINE LOW BACK PAIN WITHOUT SCIATICA: ICD-10-CM

## 2021-02-09 DIAGNOSIS — Z11.9 SCREENING EXAMINATION FOR INFECTIOUS DISEASE: ICD-10-CM

## 2021-02-09 DIAGNOSIS — R07.81 PLEURITIC CHEST PAIN: Primary | ICD-10-CM

## 2021-02-09 DIAGNOSIS — Z13.220 SCREENING CHOLESTEROL LEVEL: ICD-10-CM

## 2021-02-09 DIAGNOSIS — G89.29 CHRONIC MIDLINE LOW BACK PAIN WITHOUT SCIATICA: ICD-10-CM

## 2021-02-09 PROCEDURE — U0005 INFEC AGEN DETEC AMPLI PROBE: HCPCS

## 2021-02-09 PROCEDURE — 99999 PR PBB SHADOW E&M-EST. PATIENT-LVL IV: ICD-10-PCS | Mod: PBBFAC,,, | Performed by: INTERNAL MEDICINE

## 2021-02-09 PROCEDURE — U0003 INFECTIOUS AGENT DETECTION BY NUCLEIC ACID (DNA OR RNA); SEVERE ACUTE RESPIRATORY SYNDROME CORONAVIRUS 2 (SARS-COV-2) (CORONAVIRUS DISEASE [COVID-19]), AMPLIFIED PROBE TECHNIQUE, MAKING USE OF HIGH THROUGHPUT TECHNOLOGIES AS DESCRIBED BY CMS-2020-01-R: HCPCS

## 2021-02-09 PROCEDURE — 99999 PR PBB SHADOW E&M-EST. PATIENT-LVL IV: CPT | Mod: PBBFAC,,, | Performed by: INTERNAL MEDICINE

## 2021-02-09 PROCEDURE — 99204 PR OFFICE/OUTPT VISIT, NEW, LEVL IV, 45-59 MIN: ICD-10-PCS | Mod: S$GLB,,, | Performed by: INTERNAL MEDICINE

## 2021-02-09 PROCEDURE — 99204 OFFICE O/P NEW MOD 45 MIN: CPT | Mod: S$GLB,,, | Performed by: INTERNAL MEDICINE

## 2021-02-11 ENCOUNTER — HOSPITAL ENCOUNTER (OUTPATIENT)
Dept: PULMONOLOGY | Facility: CLINIC | Age: 21
Discharge: HOME | End: 2021-02-11
Payer: COMMERCIAL

## 2021-02-11 ENCOUNTER — PATIENT MESSAGE (OUTPATIENT)
Dept: PULMONOLOGY | Facility: CLINIC | Age: 21
End: 2021-02-11

## 2021-02-11 DIAGNOSIS — R07.81 PLEURITIC CHEST PAIN: ICD-10-CM

## 2021-02-11 LAB — SARS-COV-2 RNA RESP QL NAA+PROBE: NOT DETECTED

## 2021-02-11 PROCEDURE — 94010 BREATHING CAPACITY TEST: CPT | Mod: 53,S$GLB,, | Performed by: INTERNAL MEDICINE

## 2021-02-11 PROCEDURE — 94010 BREATHING CAPACITY TEST: ICD-10-PCS | Mod: 53,S$GLB,, | Performed by: INTERNAL MEDICINE

## 2021-02-26 ENCOUNTER — PATIENT MESSAGE (OUTPATIENT)
Dept: PULMONOLOGY | Facility: CLINIC | Age: 21
End: 2021-02-26

## 2021-03-01 ENCOUNTER — TELEPHONE (OUTPATIENT)
Dept: PULMONOLOGY | Facility: CLINIC | Age: 21
End: 2021-03-01

## 2021-03-01 RX ORDER — GABAPENTIN 100 MG/1
CAPSULE ORAL
Qty: 132 CAPSULE | Refills: 0 | Status: SHIPPED | OUTPATIENT
Start: 2021-03-01 | End: 2021-04-28

## 2021-03-03 ENCOUNTER — HOSPITAL ENCOUNTER (OUTPATIENT)
Dept: RADIOLOGY | Facility: HOSPITAL | Age: 21
Discharge: HOME | End: 2021-03-03
Attending: STUDENT IN AN ORGANIZED HEALTH CARE EDUCATION/TRAINING PROGRAM
Payer: COMMERCIAL

## 2021-03-03 DIAGNOSIS — R06.09 DYSPNEA ON EXERTION: ICD-10-CM

## 2021-03-03 PROCEDURE — 71250 CT THORAX DX C-: CPT | Mod: 26,,, | Performed by: RADIOLOGY

## 2021-03-03 PROCEDURE — 71250 CT THORAX DX C-: CPT | Mod: TC

## 2021-03-03 PROCEDURE — 71250 CT CHEST WITHOUT CONTRAST: ICD-10-PCS | Mod: 26,,, | Performed by: RADIOLOGY

## 2021-03-09 ENCOUNTER — PATIENT MESSAGE (OUTPATIENT)
Dept: PULMONOLOGY | Facility: CLINIC | Age: 21
End: 2021-03-09

## 2021-03-09 RX ORDER — FLUTICASONE PROPIONATE AND SALMETEROL XINAFOATE 230; 21 UG/1; UG/1
2 AEROSOL, METERED RESPIRATORY (INHALATION) 2 TIMES DAILY
Qty: 1 INHALER | Refills: 11 | Status: SHIPPED | OUTPATIENT
Start: 2021-03-09 | End: 2022-03-09

## 2021-04-05 ENCOUNTER — PATIENT MESSAGE (OUTPATIENT)
Dept: PULMONOLOGY | Facility: CLINIC | Age: 21
End: 2021-04-05

## 2021-04-16 ENCOUNTER — PATIENT MESSAGE (OUTPATIENT)
Dept: RESEARCH | Facility: HOSPITAL | Age: 21
End: 2021-04-16

## 2021-04-26 ENCOUNTER — PATIENT MESSAGE (OUTPATIENT)
Dept: PULMONOLOGY | Facility: CLINIC | Age: 21
End: 2021-04-26

## 2021-04-29 RX ORDER — GABAPENTIN 100 MG/1
CAPSULE ORAL
Qty: 14 CAPSULE | Refills: 0 | Status: SHIPPED | OUTPATIENT
Start: 2021-04-29 | End: 2021-08-18

## 2021-05-03 NOTE — ED NOTES
Pt lying on stretcher with eyes closed resp even unlabored. 100% Non rebreather in use. SR up Bed locked and low CB in reach. Pt is boarder in ER awaiting bed assignment.   (1) Oriented to own ability

## 2021-05-20 ENCOUNTER — OFFICE VISIT (OUTPATIENT)
Dept: PODIATRY | Facility: CLINIC | Age: 21
End: 2021-05-20
Payer: COMMERCIAL

## 2021-05-20 VITALS
HEART RATE: 72 BPM | SYSTOLIC BLOOD PRESSURE: 117 MMHG | DIASTOLIC BLOOD PRESSURE: 78 MMHG | BODY MASS INDEX: 17.86 KG/M2 | HEIGHT: 71 IN

## 2021-05-20 DIAGNOSIS — L60.0 INGROWN NAIL: Primary | ICD-10-CM

## 2021-05-20 PROCEDURE — 99999 PR PBB SHADOW E&M-EST. PATIENT-LVL III: CPT | Mod: PBBFAC,,, | Performed by: PODIATRIST

## 2021-05-20 PROCEDURE — 99202 PR OFFICE/OUTPT VISIT, NEW, LEVL II, 15-29 MIN: ICD-10-PCS | Mod: S$GLB,,, | Performed by: PODIATRIST

## 2021-05-20 PROCEDURE — 99202 OFFICE O/P NEW SF 15 MIN: CPT | Mod: S$GLB,,, | Performed by: PODIATRIST

## 2021-05-20 PROCEDURE — 99999 PR PBB SHADOW E&M-EST. PATIENT-LVL III: ICD-10-PCS | Mod: PBBFAC,,, | Performed by: PODIATRIST

## 2021-05-28 ENCOUNTER — OFFICE VISIT (OUTPATIENT)
Dept: PODIATRY | Facility: CLINIC | Age: 21
End: 2021-05-28
Payer: COMMERCIAL

## 2021-05-28 VITALS
HEART RATE: 87 BPM | DIASTOLIC BLOOD PRESSURE: 78 MMHG | BODY MASS INDEX: 17.86 KG/M2 | HEIGHT: 71 IN | SYSTOLIC BLOOD PRESSURE: 128 MMHG

## 2021-05-28 DIAGNOSIS — L60.0 INGROWN NAIL: Primary | ICD-10-CM

## 2021-05-28 PROCEDURE — 11750 EXCISION NAIL&NAIL MATRIX: CPT | Mod: TA,S$GLB,, | Performed by: PODIATRIST

## 2021-05-28 PROCEDURE — 11750 NAIL REMOVAL: ICD-10-PCS | Mod: TA,S$GLB,, | Performed by: PODIATRIST

## 2021-05-28 PROCEDURE — 99499 UNLISTED E&M SERVICE: CPT | Mod: S$GLB,,, | Performed by: PODIATRIST

## 2021-05-28 PROCEDURE — 99999 PR PBB SHADOW E&M-EST. PATIENT-LVL III: CPT | Mod: PBBFAC,,, | Performed by: PODIATRIST

## 2021-05-28 PROCEDURE — 99999 PR PBB SHADOW E&M-EST. PATIENT-LVL III: ICD-10-PCS | Mod: PBBFAC,,, | Performed by: PODIATRIST

## 2021-05-28 PROCEDURE — 99499 NO LOS: ICD-10-PCS | Mod: S$GLB,,, | Performed by: PODIATRIST

## 2021-05-29 ENCOUNTER — PATIENT MESSAGE (OUTPATIENT)
Dept: PODIATRY | Facility: CLINIC | Age: 21
End: 2021-05-29

## 2021-06-11 ENCOUNTER — OFFICE VISIT (OUTPATIENT)
Dept: PODIATRY | Facility: CLINIC | Age: 21
End: 2021-06-11
Payer: COMMERCIAL

## 2021-06-11 VITALS
BODY MASS INDEX: 17.86 KG/M2 | SYSTOLIC BLOOD PRESSURE: 113 MMHG | DIASTOLIC BLOOD PRESSURE: 71 MMHG | HEIGHT: 71 IN | HEART RATE: 87 BPM

## 2021-06-11 DIAGNOSIS — L60.0 INGROWN NAIL: Primary | ICD-10-CM

## 2021-06-11 PROCEDURE — 99212 PR OFFICE/OUTPT VISIT, EST, LEVL II, 10-19 MIN: ICD-10-PCS | Mod: S$GLB,,, | Performed by: PODIATRIST

## 2021-06-11 PROCEDURE — 99999 PR PBB SHADOW E&M-EST. PATIENT-LVL III: CPT | Mod: PBBFAC,,, | Performed by: PODIATRIST

## 2021-06-11 PROCEDURE — 99999 PR PBB SHADOW E&M-EST. PATIENT-LVL III: ICD-10-PCS | Mod: PBBFAC,,, | Performed by: PODIATRIST

## 2021-06-11 PROCEDURE — 99212 OFFICE O/P EST SF 10 MIN: CPT | Mod: S$GLB,,, | Performed by: PODIATRIST

## 2021-07-22 ENCOUNTER — TELEPHONE (OUTPATIENT)
Dept: PULMONOLOGY | Facility: CLINIC | Age: 21
End: 2021-07-22

## 2021-08-18 ENCOUNTER — TELEPHONE (OUTPATIENT)
Dept: PULMONOLOGY | Facility: CLINIC | Age: 21
End: 2021-08-18

## 2021-08-18 ENCOUNTER — LAB VISIT (OUTPATIENT)
Dept: LAB | Facility: HOSPITAL | Age: 21
End: 2021-08-18
Attending: INTERNAL MEDICINE
Payer: COMMERCIAL

## 2021-08-18 ENCOUNTER — OFFICE VISIT (OUTPATIENT)
Dept: PULMONOLOGY | Facility: CLINIC | Age: 21
End: 2021-08-18
Payer: COMMERCIAL

## 2021-08-18 VITALS
OXYGEN SATURATION: 99 % | DIASTOLIC BLOOD PRESSURE: 72 MMHG | SYSTOLIC BLOOD PRESSURE: 109 MMHG | BODY MASS INDEX: 17.26 KG/M2 | HEIGHT: 71 IN | HEART RATE: 88 BPM | WEIGHT: 123.25 LBS

## 2021-08-18 DIAGNOSIS — J31.0 RHINITIS, UNSPECIFIED TYPE: ICD-10-CM

## 2021-08-18 DIAGNOSIS — R07.81 PLEURITIC CHEST PAIN: Primary | ICD-10-CM

## 2021-08-18 DIAGNOSIS — R07.81 PLEURITIC CHEST PAIN: ICD-10-CM

## 2021-08-18 LAB
T4 SERPL-MCNC: 8.5 UG/DL (ref 4.5–11.5)
TSH SERPL DL<=0.005 MIU/L-ACNC: 2.71 UIU/ML (ref 0.4–4)

## 2021-08-18 PROCEDURE — 84443 ASSAY THYROID STIM HORMONE: CPT | Performed by: INTERNAL MEDICINE

## 2021-08-18 PROCEDURE — 99999 PR PBB SHADOW E&M-EST. PATIENT-LVL IV: CPT | Mod: PBBFAC,,, | Performed by: INTERNAL MEDICINE

## 2021-08-18 PROCEDURE — 84436 ASSAY OF TOTAL THYROXINE: CPT | Performed by: INTERNAL MEDICINE

## 2021-08-18 PROCEDURE — 86038 ANTINUCLEAR ANTIBODIES: CPT | Performed by: INTERNAL MEDICINE

## 2021-08-18 PROCEDURE — 99214 OFFICE O/P EST MOD 30 MIN: CPT | Mod: S$GLB,,, | Performed by: INTERNAL MEDICINE

## 2021-08-18 PROCEDURE — 36415 COLL VENOUS BLD VENIPUNCTURE: CPT | Performed by: INTERNAL MEDICINE

## 2021-08-18 PROCEDURE — 99214 PR OFFICE/OUTPT VISIT, EST, LEVL IV, 30-39 MIN: ICD-10-PCS | Mod: S$GLB,,, | Performed by: INTERNAL MEDICINE

## 2021-08-18 PROCEDURE — 99999 PR PBB SHADOW E&M-EST. PATIENT-LVL IV: ICD-10-PCS | Mod: PBBFAC,,, | Performed by: INTERNAL MEDICINE

## 2021-08-18 RX ORDER — AZELASTINE 1 MG/ML
1 SPRAY, METERED NASAL 2 TIMES DAILY
Qty: 30 ML | Refills: 11 | Status: SHIPPED | OUTPATIENT
Start: 2021-08-18 | End: 2024-01-14

## 2021-08-18 RX ORDER — ALBUTEROL SULFATE 90 UG/1
2 AEROSOL, METERED RESPIRATORY (INHALATION) EVERY 6 HOURS PRN
Qty: 8 G | Refills: 11 | Status: SHIPPED | OUTPATIENT
Start: 2021-08-18 | End: 2024-01-16

## 2021-08-18 RX ORDER — KETOCONAZOLE 200 MG/1
TABLET ORAL
COMMUNITY
Start: 2021-07-12 | End: 2024-01-16

## 2021-08-19 LAB — ANA SER QL IF: NORMAL

## 2021-09-28 ENCOUNTER — PATIENT MESSAGE (OUTPATIENT)
Dept: PULMONOLOGY | Facility: CLINIC | Age: 21
End: 2021-09-28

## 2021-09-28 ENCOUNTER — TELEPHONE (OUTPATIENT)
Dept: PULMONOLOGY | Facility: CLINIC | Age: 21
End: 2021-09-28

## 2021-10-12 ENCOUNTER — TELEPHONE (OUTPATIENT)
Dept: PULMONOLOGY | Facility: CLINIC | Age: 21
End: 2021-10-12

## 2021-10-14 ENCOUNTER — PATIENT MESSAGE (OUTPATIENT)
Dept: PULMONOLOGY | Facility: CLINIC | Age: 21
End: 2021-10-14
Payer: COMMERCIAL

## 2021-10-18 DIAGNOSIS — R06.02 SHORTNESS OF BREATH: Primary | ICD-10-CM

## 2021-10-18 DIAGNOSIS — Z01.818 PRE-OP TESTING: ICD-10-CM

## 2023-06-03 ENCOUNTER — OFFICE VISIT (OUTPATIENT)
Dept: URGENT CARE | Facility: CLINIC | Age: 23
End: 2023-06-03
Payer: COMMERCIAL

## 2023-06-03 VITALS
HEIGHT: 71 IN | RESPIRATION RATE: 16 BRPM | DIASTOLIC BLOOD PRESSURE: 60 MMHG | SYSTOLIC BLOOD PRESSURE: 140 MMHG | HEART RATE: 72 BPM | OXYGEN SATURATION: 99 % | WEIGHT: 123.25 LBS | TEMPERATURE: 98 F | BODY MASS INDEX: 17.26 KG/M2

## 2023-06-03 DIAGNOSIS — Z20.2 EXPOSURE TO CHLAMYDIA: ICD-10-CM

## 2023-06-03 DIAGNOSIS — A64 STD (SEXUALLY TRANSMITTED DISEASE): Primary | ICD-10-CM

## 2023-06-03 LAB
BILIRUB UR QL STRIP: NEGATIVE
GLUCOSE UR QL STRIP: NEGATIVE
KETONES UR QL STRIP: NEGATIVE
LEUKOCYTE ESTERASE UR QL STRIP: NEGATIVE
PH, POC UA: 7.5 (ref 5–8)
POC BLOOD, URINE: NEGATIVE
POC NITRATES, URINE: NEGATIVE
PROT UR QL STRIP: NEGATIVE
SP GR UR STRIP: 1.01 (ref 1–1.03)
UROBILINOGEN UR STRIP-ACNC: NORMAL (ref 0.3–2.2)

## 2023-06-03 PROCEDURE — 81003 URINALYSIS AUTO W/O SCOPE: CPT | Mod: QW,S$GLB,, | Performed by: FAMILY MEDICINE

## 2023-06-03 PROCEDURE — 99213 OFFICE O/P EST LOW 20 MIN: CPT | Mod: S$GLB,,, | Performed by: FAMILY MEDICINE

## 2023-06-03 PROCEDURE — 87491 CHLMYD TRACH DNA AMP PROBE: CPT | Performed by: FAMILY MEDICINE

## 2023-06-03 PROCEDURE — 87591 N.GONORRHOEAE DNA AMP PROB: CPT | Performed by: FAMILY MEDICINE

## 2023-06-03 PROCEDURE — 81003 POCT URINALYSIS, DIPSTICK, AUTOMATED, W/O SCOPE: ICD-10-PCS | Mod: QW,S$GLB,, | Performed by: FAMILY MEDICINE

## 2023-06-03 PROCEDURE — 99213 PR OFFICE/OUTPT VISIT, EST, LEVL III, 20-29 MIN: ICD-10-PCS | Mod: S$GLB,,, | Performed by: FAMILY MEDICINE

## 2023-06-03 RX ORDER — MONTELUKAST SODIUM 10 MG/1
1 TABLET ORAL
COMMUNITY
Start: 2023-01-17 | End: 2024-01-16

## 2023-06-03 RX ORDER — DOXYCYCLINE HYCLATE 100 MG
100 TABLET ORAL 2 TIMES DAILY
Qty: 14 TABLET | Refills: 0 | Status: SHIPPED | OUTPATIENT
Start: 2023-06-03 | End: 2023-06-10

## 2023-06-03 RX ORDER — EPINEPHRINE 0.3 MG/.3ML
INJECTION SUBCUTANEOUS
COMMUNITY
Start: 2022-07-25

## 2023-06-03 RX ORDER — FLUTICASONE FUROATE, UMECLIDINIUM BROMIDE AND VILANTEROL TRIFENATATE 200; 62.5; 25 UG/1; UG/1; UG/1
1 POWDER RESPIRATORY (INHALATION)
COMMUNITY
Start: 2022-12-18 | End: 2024-01-16

## 2023-06-03 NOTE — PROGRESS NOTES
"Subjective:      Patient ID: Radhames Garrison is a 22 y.o. male.    Vitals:  height is 5' 11" (1.803 m) and weight is 55.9 kg (123 lb 3.8 oz). His oral temperature is 98 °F (36.7 °C). His blood pressure is 140/60 (abnormal) and his pulse is 72. His respiration is 16 and oxygen saturation is 99%.     Chief Complaint: Exposure to STD    Patient reports to the clinic with the complaint of STD exposure, chlamydia, exposure  would have been through insertive intercourse  with his biologic female partner, he denies any STD symptoms.    Exposure to STD  The patient's pertinent negatives include no pelvic pain, penile discharge, penile pain, priapism, scrotal swelling or testicular pain. This is a new problem. The current episode started in the past 7 days. The problem occurs constantly. The problem has been unchanged. The patient is experiencing no pain. Pertinent negatives include no abdominal pain, chills, diarrhea, discolored urine, dysuria, fever, flank pain, frequency, headaches, hematuria, painful intercourse, sore throat, urgency or urinary retention. Nothing aggravates the symptoms. He has tried nothing for the symptoms. He is sexually active. He consistently uses condoms. It is possible that his partner has an STD. There is no history of BPH, chlamydia, cryptorchidism, erectile aid use, erectile dysfunction, a femoral hernia, gonorrhea, herpes simplex, HIV, an inguinal hernia, kidney stones, prostatitis, sickle cell disease, syphilis or varicocele.     Constitution: Negative for chills and fever.   HENT:  Negative for sore throat.    Gastrointestinal:  Negative for abdominal pain and diarrhea.   Genitourinary:  Negative for dysuria, frequency, urgency, flank pain, penile discharge, penile pain, scrotal swelling, testicular pain and pelvic pain.   Neurological:  Negative for headaches.    Objective:     Physical Exam  Constitutional: Pt oriented to person, place, and time.  Non-toxic appearance.   Patient does not " appear ill. No distress. normal  HENT: No icterus or facial swelling appreciated  Head: Normocephalic and atraumatic.   Nose: No congestion.   Pulmonary/Chest: Effort normal. No stridor. No respiratory distress.   Abdominal: Normal appearance. Abdomen exhibits no distension.   Musculoskeletal:         General: No swelling.   Neurological: no focal deficit. Patient is alert and oriented to person, place, and time.   Skin: Skin is not diaphoretic and not pale. no jaundice  Psychiatric: Patients behavior is normal. Mood, judgment and thought content normal.     Assessment:     1. STD (sexually transmitted disease)    2. Exposure to chlamydia        Plan:         Exposure to chlamydia  -     doxycycline (VIBRA-TABS) 100 MG tablet; Take 1 tablet (100 mg total) by mouth 2 (two) times daily. for 7 days  Dispense: 14 tablet; Refill: 0  -     C. trachomatis/N. gonorrhoeae by AMP DNA    -     POCT Urinalysis, Dipstick, Automated, W/O Scope  -     C. trachomatis/N. gonorrhoeae by AMP DNA

## 2023-06-06 ENCOUNTER — TELEPHONE (OUTPATIENT)
Dept: URGENT CARE | Facility: CLINIC | Age: 23
End: 2023-06-06
Payer: COMMERCIAL

## 2023-06-06 LAB
C TRACH RRNA SPEC QL NAA+PROBE: NORMAL
N GONORRHOEA RRNA SPEC QL NAA+PROBE: NORMAL
N.GONORROHEAE, AMP RNA SOURCE: NORMAL
SPECIMEN SOURCE: NORMAL

## 2023-06-06 NOTE — TELEPHONE ENCOUNTER
Unable to leave a message due to phone/answering machine problem.  Pt had presented for STD screening  but collection cancelled due to not being collected properly? He will have to return to have this collected again.

## 2024-01-14 ENCOUNTER — OFFICE VISIT (OUTPATIENT)
Dept: URGENT CARE | Facility: CLINIC | Age: 24
End: 2024-01-14
Payer: COMMERCIAL

## 2024-01-14 VITALS
RESPIRATION RATE: 16 BRPM | HEART RATE: 86 BPM | TEMPERATURE: 99 F | WEIGHT: 125 LBS | SYSTOLIC BLOOD PRESSURE: 117 MMHG | BODY MASS INDEX: 16.93 KG/M2 | HEIGHT: 72 IN | OXYGEN SATURATION: 98 % | DIASTOLIC BLOOD PRESSURE: 75 MMHG

## 2024-01-14 DIAGNOSIS — R10.31 RIGHT LOWER QUADRANT PAIN: ICD-10-CM

## 2024-01-14 DIAGNOSIS — R11.2 NAUSEA AND VOMITING, UNSPECIFIED VOMITING TYPE: Primary | ICD-10-CM

## 2024-01-14 DIAGNOSIS — K59.09 OTHER CONSTIPATION: ICD-10-CM

## 2024-01-14 PROBLEM — Z72.0 CURRENT TOBACCO USE: Status: ACTIVE | Noted: 2018-08-19

## 2024-01-14 LAB
BILIRUB UR QL STRIP: NEGATIVE
CTP QC/QA: YES
CTP QC/QA: YES
GLUCOSE UR QL STRIP: NEGATIVE
KETONES UR QL STRIP: NEGATIVE
LEUKOCYTE ESTERASE UR QL STRIP: NEGATIVE
PH, POC UA: 5.5 (ref 5–8)
POC BLOOD, URINE: NEGATIVE
POC MOLECULAR INFLUENZA A AGN: NEGATIVE
POC MOLECULAR INFLUENZA B AGN: NEGATIVE
POC NITRATES, URINE: NEGATIVE
PROT UR QL STRIP: NEGATIVE
SARS-COV-2 AG RESP QL IA.RAPID: NEGATIVE
SP GR UR STRIP: 1.02 (ref 1–1.03)
UROBILINOGEN UR STRIP-ACNC: NORMAL (ref 0.3–2.2)

## 2024-01-14 PROCEDURE — 81003 URINALYSIS AUTO W/O SCOPE: CPT | Mod: QW,S$GLB,, | Performed by: PHYSICIAN ASSISTANT

## 2024-01-14 PROCEDURE — 74019 RADEX ABDOMEN 2 VIEWS: CPT | Mod: S$GLB,,, | Performed by: RADIOLOGY

## 2024-01-14 PROCEDURE — 87811 SARS-COV-2 COVID19 W/OPTIC: CPT | Mod: QW,S$GLB,, | Performed by: PHYSICIAN ASSISTANT

## 2024-01-14 PROCEDURE — 99214 OFFICE O/P EST MOD 30 MIN: CPT | Mod: S$GLB,,, | Performed by: PHYSICIAN ASSISTANT

## 2024-01-14 PROCEDURE — 87502 INFLUENZA DNA AMP PROBE: CPT | Mod: QW,S$GLB,, | Performed by: PHYSICIAN ASSISTANT

## 2024-01-14 RX ORDER — POLYETHYLENE GLYCOL 3350 17 G/17G
17 POWDER, FOR SOLUTION ORAL DAILY
Qty: 14 PACKET | Refills: 0 | Status: SHIPPED | OUTPATIENT
Start: 2024-01-14 | End: 2024-01-28

## 2024-01-14 RX ORDER — DOCUSATE SODIUM 100 MG/1
100 CAPSULE, LIQUID FILLED ORAL 3 TIMES DAILY PRN
Qty: 21 CAPSULE | Refills: 0 | Status: SHIPPED | OUTPATIENT
Start: 2024-01-14 | End: 2024-01-21

## 2024-01-14 RX ORDER — ONDANSETRON HYDROCHLORIDE 8 MG/1
8 TABLET, FILM COATED ORAL EVERY 8 HOURS PRN
Qty: 9 TABLET | Refills: 0 | Status: SHIPPED | OUTPATIENT
Start: 2024-01-14 | End: 2024-01-17

## 2024-01-14 NOTE — PATIENT INSTRUCTIONS
You need to increase your fiber and water intake to help regulate your bowel movements. Fiber can be found in vegetables, fruit, whole grain wheats and grains. If you are unable to get the appropriate amount of fiber in your system you can also obtain metamucil over the counter. Follow directions on the back of the label. You should be striving for 6-8 glasses of water a day. Use stool softeners and dulcolax as we discussed.  Use Zofran as needed for nausea If your symptoms worsen please go to the ER or follow up at urgent care/primary care provider.

## 2024-01-14 NOTE — PROGRESS NOTES
Subjective:      Patient ID: Radhames Garrison is a 23 y.o. male.    Vitals:  height is 6' (1.829 m) and weight is 56.7 kg (125 lb). His oral temperature is 98.5 °F (36.9 °C). His blood pressure is 117/75 and his pulse is 86. His respiration is 16 and oxygen saturation is 98%.     Chief Complaint: Abdominal Pain    23 y.o male c/o abdominal pain started 2 days ago.  Patient reports over the past week and a half he has been dealing with constipation.  He took a laxative and suppository on Thursday with no relief.  He did start having slight diarrhea.  Denies any bloody, foul smelling or foaming diarrhea.  Patient's last bowel movement was 3 days ago.  He has not been eating or drinking a lot due to appetite loss and nausea.  This has happened before earlier last year patient was seen in the ER and diagnosed with colitis.  He never followed up with GI.  Patient states it feels like that pain.    Abdominal Pain  This is a new problem. The current episode started today. The onset quality is sudden. The problem occurs constantly. The problem has been gradually worsening. The pain is located in the generalized abdominal region. The pain is at a severity of 9/10. The pain is severe. The quality of the pain is aching. The abdominal pain does not radiate. Associated symptoms include constipation, diarrhea, dysuria, nausea and vomiting. Pertinent negatives include no anorexia, arthralgias, belching, fever, flatus, frequency, headaches, hematochezia, hematuria, melena, myalgias or weight loss. Nothing aggravates the pain. The pain is relieved by Nothing. The treatment provided no relief. There is no history of abdominal surgery, colon cancer, Crohn's disease, gallstones, GERD, irritable bowel syndrome, pancreatitis, PUD or ulcerative colitis. Patient's medical history does not include kidney stones and UTI.       Constitution: Positive for appetite change and fatigue. Negative for chills, sweating and fever.   HENT:  Negative  for congestion and sore throat.    Neck: Negative for neck pain, neck stiffness and painful lymph nodes.   Cardiovascular:  Negative for chest pain and sob on exertion.   Respiratory:  Negative for cough and shortness of breath.    Gastrointestinal:  Positive for abdominal pain, abdominal bloating, nausea, vomiting, constipation and diarrhea. Negative for history of abdominal surgery, bright red blood in stool, dark colored stools, rectal bleeding, rectal pain, hemorrhoids, heartburn and bowel incontinence.   Genitourinary:  Positive for dysuria. Negative for frequency, urgency, urine decreased, flank pain, hematuria, penile discharge and penile pain.   Musculoskeletal:  Negative for joint pain, back pain, muscle cramps and muscle ache.   Skin:  Negative for rash.   Neurological:  Negative for dizziness, headaches and altered mental status.   Hematologic/Lymphatic: Negative for swollen lymph nodes.   Psychiatric/Behavioral:  Negative for altered mental status.       Past Medical History:   Diagnosis Date    Depression     History of psychiatric care     History of psychiatric hospitalization     Pneumothorax        History reviewed. No pertinent surgical history.    History reviewed. No pertinent family history.    Social History     Socioeconomic History    Marital status: Single   Tobacco Use    Smoking status: Former     Current packs/day: 0.00     Types: Cigarettes     Quit date: 2019     Years since quittin.5     Passive exposure: Past    Smokeless tobacco: Never   Substance and Sexual Activity    Alcohol use: No    Drug use: Yes     Types: Marijuana   Other Topics Concern    Childhood History: Raised by parents Yes       Current Outpatient Medications   Medication Sig Dispense Refill    albuterol (PROVENTIL/VENTOLIN HFA) 90 mcg/actuation inhaler Inhale 2 puffs into the lungs every 6 (six) hours as needed for Wheezing. 8 g 11    ALBUTEROL, BULK, MISC       EPINEPHrine (EPIPEN) 0.3 mg/0.3 mL AtIn as  directed      fluticasone-salmeterol 230-21 mcg/dose (ADVAIR HFA) 230-21 mcg/actuation HFAA inhaler Inhale 2 puffs into the lungs 2 (two) times daily. Rinse and gargle after each use. 1 Inhaler 11    ibuprofen (ADVIL,MOTRIN) 200 MG tablet Take 200 mg by mouth every 6 (six) hours as needed for Pain.      ketoconazole (NIZORAL) 200 mg Tab Take by mouth.      loratadine (CLARITIN) 10 mg tablet Take 10 mg by mouth daily as needed for Allergies.      MELATONIN ORAL Take 10 mg by mouth nightly as needed.      montelukast (SINGULAIR) 10 mg tablet 1 tablet.      TRELEGY ELLIPTA 200-62.5-25 mcg inhaler 1 puff.       No current facility-administered medications for this visit.       Review of patient's allergies indicates:  No Known Allergies    Objective:     Physical Exam   Constitutional: He is oriented to person, place, and time. He appears well-developed.  Non-toxic appearance. He appears ill. No distress.   HENT:   Head: Normocephalic and atraumatic.   Ears:   Right Ear: External ear normal.   Left Ear: External ear normal.   Nose: Nose normal.   Mouth/Throat: Mucous membranes are normal.   Eyes: Conjunctivae and lids are normal. Right eye exhibits no discharge. Left eye exhibits no discharge. No scleral icterus.   Neck: Trachea normal.   Cardiovascular: Normal rate, regular rhythm and normal heart sounds.   No murmur heard.Exam reveals no gallop.   Pulmonary/Chest: Effort normal and breath sounds normal. No stridor. No respiratory distress. He has no wheezes. He has no rhonchi. He has no rales.   Abdominal: Normal appearance. He exhibits no distension and no mass. Soft. Bowel sounds are increased. There is abdominal tenderness in the right lower quadrant. There is no rebound, no guarding, no tenderness at McBurney's point, negative Solorio's sign, no left CVA tenderness, negative Rovsing's sign, negative psoas sign and no right CVA tenderness. No hernia.   Neurological: He is alert and oriented to person, place, and  time. He has normal strength.   Skin: Skin is warm, dry, intact, not diaphoretic, not pale and no rash.   Psychiatric: His speech is normal and behavior is normal. Mood, judgment and thought content normal.   Nursing note and vitals reviewed.    Results for orders placed or performed in visit on 01/14/24   SARS Coronavirus 2 Antigen, POCT Manual Read   Result Value Ref Range    SARS Coronavirus 2 Antigen Negative Negative     Acceptable Yes    POCT Influenza A/B MOLECULAR   Result Value Ref Range    POC Molecular Influenza A Ag Negative Negative, Not Reported    POC Molecular Influenza B Ag Negative Negative, Not Reported     Acceptable Yes    POCT Urinalysis, Dipstick, Automated, W/O Scope   Result Value Ref Range    POC Blood, Urine Negative Negative, Positive Slide, Positive Tube    POC Bilirubin, Urine Negative Negative, Positive Slide, Positive Tube    POC Urobilinogen, Urine Normal 0.3 - 2.2    POC Ketones, Urine Negative Negative, Positive Slide, Positive Tube    POC Protein, Urine Negative Negative, Positive Slide, Positive Tube    POC Nitrates, Urine Negative Negative, Positive Slide, Positive Tube    POC Glucose, Urine Negative Negative, Positive Slide, Positive Tube    pH, UA 5.5 5 - 8    POC Specific Gravity, Urine 1.025 1.003 - 1.029    POC Leukocytes, Urine Negative Negative, Positive Slide, Positive Tube     XR ABDOMEN FLAT AND ERECT    Result Date: 1/14/2024  EXAMINATION: XR ABDOMEN FLAT AND ERECT CLINICAL HISTORY: r/o obstruction;Right lower quadrant pain TECHNIQUE: Flat and erect AP views of the abdomen were performed. COMPARISON: None FINDINGS: Moderate stool retention.  Nonspecific bowel gas pattern.  No bowel dilation.  The upright film demonstrate no air-fluid level.  No organomegaly.  Three punctate calcifications project over the right kidney region, possibly uroliths.  The lung bases are clear.  The osseous structures appear normal..     Three punctate  calcifications project over the right kidney region, possibly uroliths Electronically signed by: Luzmaria Delgadillo MD Date:    01/14/2024 Time:    11:27     Assessment:     1. Nausea and vomiting, unspecified vomiting type    2. Right lower quadrant pain    3. Other constipation        Plan:       Nausea and vomiting, unspecified vomiting type  -     SARS Coronavirus 2 Antigen, POCT Manual Read  -     POCT Influenza A/B MOLECULAR  -     POCT Urinalysis, Dipstick, Automated, W/O Scope  -     ondansetron (ZOFRAN) 8 MG tablet; Take 1 tablet (8 mg total) by mouth every 8 (eight) hours as needed for Nausea.  Dispense: 9 tablet; Refill: 0  -     Ambulatory referral/consult to Gastroenterology    Right lower quadrant pain  -     XR ABDOMEN FLAT AND ERECT  -     polyethylene glycol (MIRALAX) 17 gram PwPk; Take 17 g by mouth once daily. for 14 days  Dispense: 14 packet; Refill: 0  -     docusate sodium (COLACE) 100 MG capsule; Take 1 capsule (100 mg total) by mouth 3 (three) times daily as needed for Constipation.  Dispense: 21 capsule; Refill: 0  -     Ambulatory referral/consult to Gastroenterology    Other constipation  -     polyethylene glycol (MIRALAX) 17 gram PwPk; Take 17 g by mouth once daily. for 14 days  Dispense: 14 packet; Refill: 0  -     docusate sodium (COLACE) 100 MG capsule; Take 1 capsule (100 mg total) by mouth 3 (three) times daily as needed for Constipation.  Dispense: 21 capsule; Refill: 0  -     Ambulatory referral/consult to Gastroenterology        Results reviewed  I have reviewed the patient chart and pertinent past imaging/labs.     GI referral placed patient given the referral number to call if he does not hear from them in a week.  Patient is on strict ER precautions.    Patient Instructions   You need to increase your fiber and water intake to help regulate your bowel movements. Fiber can be found in vegetables, fruit, whole grain wheats and grains. If you are unable to get the appropriate  amount of fiber in your system you can also obtain metamucil over the counter. Follow directions on the back of the label. You should be striving for 6-8 glasses of water a day. Use stool softeners and dulcolax as we discussed.  Use Zofran as needed for nausea If your symptoms worsen please go to the ER or follow up at urgent care/primary care provider.

## 2024-01-16 ENCOUNTER — LAB VISIT (OUTPATIENT)
Dept: LAB | Facility: HOSPITAL | Age: 24
End: 2024-01-16
Attending: INTERNAL MEDICINE

## 2024-01-16 ENCOUNTER — OFFICE VISIT (OUTPATIENT)
Dept: GASTROENTEROLOGY | Facility: CLINIC | Age: 24
End: 2024-01-16
Payer: COMMERCIAL

## 2024-01-16 VITALS
HEIGHT: 71 IN | BODY MASS INDEX: 17.19 KG/M2 | HEART RATE: 74 BPM | WEIGHT: 122.81 LBS | DIASTOLIC BLOOD PRESSURE: 80 MMHG | SYSTOLIC BLOOD PRESSURE: 112 MMHG

## 2024-01-16 DIAGNOSIS — R93.5 ABNORMAL CT OF THE ABDOMEN: ICD-10-CM

## 2024-01-16 DIAGNOSIS — K59.00 ACUTE CONSTIPATION: ICD-10-CM

## 2024-01-16 DIAGNOSIS — R10.31 RIGHT LOWER QUADRANT ABDOMINAL PAIN: Primary | ICD-10-CM

## 2024-01-16 DIAGNOSIS — R10.31 RIGHT LOWER QUADRANT ABDOMINAL PAIN: ICD-10-CM

## 2024-01-16 LAB
ALBUMIN SERPL BCP-MCNC: 4.2 G/DL (ref 3.5–5.2)
ALP SERPL-CCNC: 57 U/L (ref 55–135)
ALT SERPL W/O P-5'-P-CCNC: 10 U/L (ref 10–44)
ANION GAP SERPL CALC-SCNC: 9 MMOL/L (ref 8–16)
AST SERPL-CCNC: 14 U/L (ref 10–40)
BASOPHILS # BLD AUTO: 0.02 K/UL (ref 0–0.2)
BASOPHILS NFR BLD: 0.4 % (ref 0–1.9)
BILIRUB SERPL-MCNC: 1.6 MG/DL (ref 0.1–1)
BUN SERPL-MCNC: 15 MG/DL (ref 6–20)
CALCIUM SERPL-MCNC: 9.6 MG/DL (ref 8.7–10.5)
CHLORIDE SERPL-SCNC: 104 MMOL/L (ref 95–110)
CO2 SERPL-SCNC: 27 MMOL/L (ref 23–29)
CREAT SERPL-MCNC: 1.2 MG/DL (ref 0.5–1.4)
CRP SERPL-MCNC: 6.4 MG/L (ref 0–8.2)
DIFFERENTIAL METHOD BLD: ABNORMAL
EOSINOPHIL # BLD AUTO: 0.1 K/UL (ref 0–0.5)
EOSINOPHIL NFR BLD: 2.9 % (ref 0–8)
ERYTHROCYTE [DISTWIDTH] IN BLOOD BY AUTOMATED COUNT: 11.4 % (ref 11.5–14.5)
ERYTHROCYTE [SEDIMENTATION RATE] IN BLOOD BY PHOTOMETRIC METHOD: <2 MM/HR (ref 0–23)
EST. GFR  (NO RACE VARIABLE): >60 ML/MIN/1.73 M^2
GLUCOSE SERPL-MCNC: 83 MG/DL (ref 70–110)
HCT VFR BLD AUTO: 45.7 % (ref 40–54)
HGB BLD-MCNC: 15.4 G/DL (ref 14–18)
IMM GRANULOCYTES # BLD AUTO: 0.01 K/UL (ref 0–0.04)
IMM GRANULOCYTES NFR BLD AUTO: 0.2 % (ref 0–0.5)
LIPASE SERPL-CCNC: 21 U/L (ref 4–60)
LYMPHOCYTES # BLD AUTO: 1.4 K/UL (ref 1–4.8)
LYMPHOCYTES NFR BLD: 28 % (ref 18–48)
MCH RBC QN AUTO: 29.2 PG (ref 27–31)
MCHC RBC AUTO-ENTMCNC: 33.7 G/DL (ref 32–36)
MCV RBC AUTO: 87 FL (ref 82–98)
MONOCYTES # BLD AUTO: 0.6 K/UL (ref 0.3–1)
MONOCYTES NFR BLD: 12 % (ref 4–15)
NEUTROPHILS # BLD AUTO: 2.7 K/UL (ref 1.8–7.7)
NEUTROPHILS NFR BLD: 56.5 % (ref 38–73)
NRBC BLD-RTO: 0 /100 WBC
PLATELET # BLD AUTO: 246 K/UL (ref 150–450)
PMV BLD AUTO: 9.6 FL (ref 9.2–12.9)
POTASSIUM SERPL-SCNC: 4.4 MMOL/L (ref 3.5–5.1)
PROT SERPL-MCNC: 7 G/DL (ref 6–8.4)
RBC # BLD AUTO: 5.27 M/UL (ref 4.6–6.2)
SODIUM SERPL-SCNC: 140 MMOL/L (ref 136–145)
TSH SERPL DL<=0.005 MIU/L-ACNC: 0.98 UIU/ML (ref 0.4–4)
WBC # BLD AUTO: 4.83 K/UL (ref 3.9–12.7)

## 2024-01-16 PROCEDURE — 83690 ASSAY OF LIPASE: CPT | Performed by: INTERNAL MEDICINE

## 2024-01-16 PROCEDURE — 86140 C-REACTIVE PROTEIN: CPT | Performed by: INTERNAL MEDICINE

## 2024-01-16 PROCEDURE — 3079F DIAST BP 80-89 MM HG: CPT | Mod: CPTII,S$GLB,, | Performed by: INTERNAL MEDICINE

## 2024-01-16 PROCEDURE — 3008F BODY MASS INDEX DOCD: CPT | Mod: CPTII,S$GLB,, | Performed by: INTERNAL MEDICINE

## 2024-01-16 PROCEDURE — 85652 RBC SED RATE AUTOMATED: CPT | Performed by: INTERNAL MEDICINE

## 2024-01-16 PROCEDURE — 85025 COMPLETE CBC W/AUTO DIFF WBC: CPT | Performed by: INTERNAL MEDICINE

## 2024-01-16 PROCEDURE — 86258 DGP ANTIBODY EACH IG CLASS: CPT | Mod: 59 | Performed by: INTERNAL MEDICINE

## 2024-01-16 PROCEDURE — 80053 COMPREHEN METABOLIC PANEL: CPT | Performed by: INTERNAL MEDICINE

## 2024-01-16 PROCEDURE — 84443 ASSAY THYROID STIM HORMONE: CPT | Performed by: INTERNAL MEDICINE

## 2024-01-16 PROCEDURE — 3074F SYST BP LT 130 MM HG: CPT | Mod: CPTII,S$GLB,, | Performed by: INTERNAL MEDICINE

## 2024-01-16 PROCEDURE — 1160F RVW MEDS BY RX/DR IN RCRD: CPT | Mod: CPTII,S$GLB,, | Performed by: INTERNAL MEDICINE

## 2024-01-16 PROCEDURE — 86364 TISS TRNSGLTMNASE EA IG CLAS: CPT | Performed by: INTERNAL MEDICINE

## 2024-01-16 PROCEDURE — 99999 PR PBB SHADOW E&M-EST. PATIENT-LVL III: CPT | Mod: PBBFAC,,, | Performed by: INTERNAL MEDICINE

## 2024-01-16 PROCEDURE — 1159F MED LIST DOCD IN RCRD: CPT | Mod: CPTII,S$GLB,, | Performed by: INTERNAL MEDICINE

## 2024-01-16 PROCEDURE — 99203 OFFICE O/P NEW LOW 30 MIN: CPT | Mod: S$GLB,,, | Performed by: INTERNAL MEDICINE

## 2024-01-16 NOTE — PROGRESS NOTES
"GENERAL GI PATIENT INTAKE:    COVID symptoms in the last 7 days (runny nose, sore throat, congestion, cough, fever): No  PCP: Lore Xavier  If not PCP-  number given to establish [phone]: N/A    ALLERGIES REVIEWED:  Yes    CHIEF COMPLAINT:    Chief Complaint   Patient presents with    Abdominal Pain    Constipation       VITAL SIGNS:  /80   Pulse 74   Ht 5' 11" (1.803 m)   Wt 55.7 kg (122 lb 12.7 oz)   BMI 17.13 kg/m²      Change in medical, surgical, family or social history: No      REVIEWED MEDICATION LIST RECONCILED INCLUDING ABOVE MEDS:  Yes     "

## 2024-01-16 NOTE — PROGRESS NOTES
[facility] Consultation Note    Reason for Consult:    Chief Complaint   Patient presents with    Abdominal Pain    Constipation       PCP:   Lore Xavier    Referring MD:  Jena Weston, Paandres  [address] [place] [id number]      HPI:  Radhames Garrison is a 23 y.o. male here for evaluation of acute abdominal pain and constipation.  He is new to our clinic.  He was seen in urgent care 2 days ago for severe right lower quadrant abdominal pain associated with decreased bowel function.  This had also been associated with abdominal fullness, decreased appetite, and nausea.  He denied blood in stool.  This had been going on for the past week.  He has had problems like this in the past with a similar episode in July.  He went to [facility] at that time.  He was found to have suspected colitis and treated with ciprofloxacin and Flagyl.  Constipation is not an ongoing problem in his mind.  Has intermittent episodes which she attributes to lifestyle changes and lying in bed.  He feels these exacerbate the episodes of constipation.  He took an OTC laxative that caused severe abdominal pain.  He also took an OTC stool softener and glycerin suppository.  He was given a prescription stool softener and advised to take MiraLax after visiting the urgent care.  He still has not had a bowel movement since being an urgent care.  He normally has bowel movements Q 1-2 days.          ROS:  Constitutional: No fevers, chills, No weight loss  ENT: No congestion, rhinorrhea, or chronic sinus problems  CV: No chest pain or palpitations  Pulm: No cough, No shortness of breath  Ophtho: No vision changes or pain  GI: see HPI  Derm: No rash or lesions  Heme: No lymphadenopathy, No bruising  MSK: No arthritis or joint swelling  : No dysuria, No frequent urination        Medical History:  has a past medical history of Depression, History of psychiatric care,  History of psychiatric hospitalization, and Pneumothorax.    Surgical History:  has no past surgical history on file.    Family History: family history is not on file.    Social History:  reports that he quit smoking about 4 years ago. His smoking use included cigarettes. He has been exposed to tobacco smoke. He has never used smokeless tobacco. He reports current drug use. Drug: Marijuana. He reports that he does not drink alcohol.    Review of patient's allergies indicates:  No Known Allergies    Prior to Admission medications    Medication Sig Start Date End Date Taking? Authorizing Provider   docusate sodium (COLACE) 100 MG capsule Take 1 capsule (100 mg total) by mouth 3 (three) times daily as needed for Constipation. 1/14/24 1/21/24 Yes Jena Weston PA-C   EPINEPHrine (EPIPEN) 0.3 mg/0.3 mL AtIn as directed 7/25/22  Yes Provider, Historical   ibuprofen (ADVIL,MOTRIN) 200 MG tablet Take 200 mg by mouth every 6 (six) hours as needed for Pain.   Yes Provider, Historical   ondansetron (ZOFRAN) 8 MG tablet Take 1 tablet (8 mg total) by mouth every 8 (eight) hours as needed for Nausea. 1/14/24 1/17/24 Yes Jena Weston PA-C   polyethylene glycol (MIRALAX) 17 gram PwPk Take 17 g by mouth once daily. for 14 days 1/14/24 1/28/24 Yes Jena Weston PA-C   fluticasone-salmeterol 230-21 mcg/dose (ADVAIR HFA) 230-21 mcg/actuation HFAA inhaler Inhale 2 puffs into the lungs 2 (two) times daily. Rinse and gargle after each use. 3/9/21 3/9/22  Freda Triana MD   albuterol (PROVENTIL/VENTOLIN HFA) 90 mcg/actuation inhaler Inhale 2 puffs into the lungs every 6 (six) hours as needed for Wheezing. 8/18/21 1/16/24  Freda Triana MD   ALBUTEROL, BULK, MISC   1/16/24  Provider, Historical   ketoconazole (NIZORAL) 200 mg Tab Take by mouth. 7/12/21 1/16/24  Provider, Historical   loratadine (CLARITIN) 10 mg tablet Take 10 mg by mouth daily as needed for Allergies.  1/16/24  Provider, Historical   MELATONIN  "ORAL Take 10 mg by mouth nightly as needed.  1/16/24  Provider, Historical   montelukast (SINGULAIR) 10 mg tablet 1 tablet. 1/17/23 1/16/24  Provider, Historical   TRELEGY ELLIPTA 200-62.5-25 mcg inhaler 1 puff. 12/18/22 1/16/24  Provider, Historical       Objective Findings:  Vital Signs:  /80   Pulse 74   Ht 5' 11" (1.803 m)   Wt 55.7 kg (122 lb 12.7 oz)   BMI 17.13 kg/m²   Body mass index is 17.13 kg/m².      Physical Exam:  General Appearance:  Well appearing in no acute distress, appears stated age  Head:  Normocephalic, atraumatic  Eyes:  No scleral icterus or pallor, EOMI  Abdomen:  Soft, tender in the right lower quadrant and right side without rebound, non distended. No hepatosplenomegaly, ascites, or mass  Extremities:  No clubbing, cyanosis, or edema  Skin:  No rash  Neurologic:  AAO x 4; CN II-XII intact      Labs:  Lab Results   Component Value Date    WBC 5.30 07/09/2019    HGB 14.2 07/09/2019    HCT 41.7 07/09/2019    MCV 87 07/09/2019    RDW 12.3 07/09/2019     07/09/2019    GRAN 3.0 07/09/2019    GRAN 56.9 07/09/2019    LYMPH 1.5 07/09/2019    LYMPH 28.9 07/09/2019    MONO 0.6 07/09/2019    MONO 11.9 07/09/2019    EOS 0.1 07/09/2019    BASO 0.01 07/09/2019     Lab Results   Component Value Date     07/09/2019    K 3.7 07/09/2019     07/09/2019    CO2 26 07/09/2019    GLU 70 07/09/2019    BUN 19 07/09/2019    CREATININE 0.9 07/09/2019    CALCIUM 9.8 07/09/2019    PROT 6.8 07/09/2019    ALBUMIN 4.4 07/09/2019    BILITOT 1.2 (H) 07/09/2019    ALKPHOS 69 07/09/2019    AST 16 07/09/2019    ALT 16 07/09/2019       Urinalysis 01/14/2024 was normal              Imaging:  CT of the abdomen and pelvis with IV contrast 07/19/2023:   1.   Diffuse hyperenhancement of the prostate and seminal vesicles, nonspecific. Please assess for signs/symptoms of prostatitis.   2.   Nonspecific bilateral enhancing pattern involving the ascending colon and hepatic flexure which could reflect a " physiologic state or infectious colitis. Consider clinical follow-up.   3.   A 3 mm nonobstructing right renal stone. No hydronephrosis.                 Assessment:  Radhames Garrison is a 23 y.o. male with:  1. Right lower quadrant abdominal pain    2. Acute constipation    3. Abnormal CT of the abdomen      Acute onset of right-sided and right lower abdominal pain associated with acute constipation.  Pain could have been related to use of stimulant laxatives and decreased bowel movements due to constipation.  This appears to be a recurrent problem, and occurred in July of last year.  Suspected colitis seen at that time.  He is tried OTC laxative, stool softener, and MiraLax with no stool production in the last couple of days.      Recommendations/Plan:  1. Labs as noted below  2. I will arrange for an urgent CT of the abdomen and pelvis with IV and oral contrast  3. I advised him to increase MiraLax.  He may use 3-4 doses every 30 minutes.  He could also get a bottle of magnesium citrate, if he is able to find this at the local drug store.  He may also try a Fleet's enema.      Follow-up pending test results.      Order summary:  Orders Placed This Encounter    CT Abdomen Pelvis With IV Contrast Routine Oral Contrast    CBC Auto Differential    Comprehensive Metabolic Panel    C-reactive protein    ESR (SEDIMENTATION RATE, MANUAL)    Lipase    Celiac Disease Panel    Calprotectin, Stool    TSH         Thank you so much for allowing me to participate in the care of Radhames Garrison        Tim Will MD

## 2024-01-16 NOTE — PATIENT INSTRUCTIONS
Try 3-4 doses of MiraLax every 30 min until you have a bowel movement.    You can try an over-the-counter enema, such as Fleet's.    You can also look for Magnesium Citrate, which is another over-the-counter laxative.  If you are able to find this, take one bottle followed by a full glass of water.     We will arrange for blood work and a CT scan of the abdomen.

## 2024-01-17 ENCOUNTER — HOSPITAL ENCOUNTER (OUTPATIENT)
Dept: RADIOLOGY | Facility: HOSPITAL | Age: 24
Discharge: HOME | End: 2024-01-17
Attending: INTERNAL MEDICINE
Payer: COMMERCIAL

## 2024-01-17 DIAGNOSIS — R10.31 RIGHT LOWER QUADRANT ABDOMINAL PAIN: ICD-10-CM

## 2024-01-17 DIAGNOSIS — K59.00 ACUTE CONSTIPATION: ICD-10-CM

## 2024-01-17 DIAGNOSIS — R93.5 ABNORMAL CT OF THE ABDOMEN: ICD-10-CM

## 2024-01-17 PROCEDURE — 74177 CT ABD & PELVIS W/CONTRAST: CPT | Mod: 26,,, | Performed by: RADIOLOGY

## 2024-01-17 PROCEDURE — 74177 CT ABD & PELVIS W/CONTRAST: CPT | Mod: TC

## 2024-01-17 PROCEDURE — 25500020 PHARM REV CODE 255: Performed by: INTERNAL MEDICINE

## 2024-01-17 RX ADMIN — IOHEXOL 75 ML: 350 INJECTION, SOLUTION INTRAVENOUS at 05:01

## 2024-01-19 ENCOUNTER — TELEPHONE (OUTPATIENT)
Dept: GASTROENTEROLOGY | Facility: CLINIC | Age: 24
End: 2024-01-19
Payer: COMMERCIAL

## 2024-01-19 ENCOUNTER — PATIENT MESSAGE (OUTPATIENT)
Dept: GASTROENTEROLOGY | Facility: CLINIC | Age: 24
End: 2024-01-19
Payer: COMMERCIAL

## 2024-01-19 NOTE — TELEPHONE ENCOUNTER
I talked to the patient by phone regarding his recent messages.  Discussed CT results.  He has reporting not having much stool production.  He doubled up on the MiraLax and has taken several doses over the last few days, each day.  He still feels like he needs to have bowel movements, but can not.  He also tried an enema.  He has not eating as much due to decreased appetite.  There were no inflammatory changes seen on the scan.  No signs of intestinal blockage.    I recommend taking high-dose MiraLax for now.  One packet in 8 oz of water Q 15 minutes x4 doses.  If no improvement after 1-2 hours, repeat this.  This would be up to 64 oz of MiraLax.  If this does not work, we should consider EGD and colonoscopy to assess further.    I asked him to update me to his progress after the weekend.

## 2024-01-23 LAB
GLIADIN PEPTIDE IGA SER-ACNC: 1 U/ML
GLIADIN PEPTIDE IGG SER-ACNC: <0.6 U/ML
IGA SERPL-MCNC: 70 MG/DL (ref 70–400)
TTG IGA SER-ACNC: 0.2 U/ML
TTG IGG SER-ACNC: 0.8 U/ML

## 2024-03-11 NOTE — PROGRESS NOTES
Patient ID: Radhames Garrison is a 23 y.o. male.    Chief Complaint: Vasectomy  Patient is a 23 y.o. male who is new to our clinic and self-referred for evaluation of a possible vasectomy.     [facility]    Vasectomy Consult  Patient here for pre-vasectomy consultation. He denies hematuria, urinary tract infections, urolithiasis, prostatitis, erectile dysfunction, previous genitourinary surgery, epididymal orchitis, testicular masses, scrotal trauma, sexually transmitted diseases. He was given the consent form, pre-vasectomy instruction sheet, and vasectomy booklet. I extensively reviewed with him the likely postoperative recuperative period as well as the need to continue to use contraception until he is notified by us of his sterility. He will have a semen analysis after 25 ejaculations. He understands the potential side effects of anesthesia, bleeding, scrotal hematoma, wound infection, epididymal orchitis, epididymal congestion, chronic testicular pain requiring further surgery, sperm granuloma, antisperm antibodies, early recanalization, spontaneous recanalization with pregnancy after demonstration of azoospermia and the possible association with prostate cancer. He is aware of alternatives to vasectomy. He has given this careful consideration and wishes to proceed with a vasectomy.       Review of Systems  All other systems reviewed and negative except pertinent positives noted in HPI.      Objective:     Physical Exam  Constitutional:       General: He is not in acute distress.     Appearance: He is well-developed.   HENT:      Head: Normocephalic and atraumatic.   Eyes:      General: No scleral icterus.  Neck:      Trachea: No tracheal deviation.   Pulmonary:      Effort: Pulmonary effort is normal. No respiratory distress.   Neurological:      Mental Status: He is alert and oriented to person, place, and time.   Psychiatric:         Behavior: Behavior normal.         Thought Content: Thought content normal.          Judgment: Judgment normal.         Assessment:     1. Vasectomy evaluation      Plan:     1. Vasectomy evaluation        No orders of the defined types were placed in this encounter.      -I have explained the indication, risks, benefits, and alternatives of the procedure in detail.  The patient voices understanding and all questions have been answered.  The patient agrees to proceed as planned with vasectomy.  - Valium and pain medications prescribed

## 2024-03-12 ENCOUNTER — OFFICE VISIT (OUTPATIENT)
Dept: UROLOGY | Facility: CLINIC | Age: 24
End: 2024-03-12
Payer: COMMERCIAL

## 2024-03-12 VITALS
SYSTOLIC BLOOD PRESSURE: 118 MMHG | BODY MASS INDEX: 17.28 KG/M2 | HEART RATE: 91 BPM | HEIGHT: 71 IN | DIASTOLIC BLOOD PRESSURE: 79 MMHG | WEIGHT: 123.44 LBS

## 2024-03-12 DIAGNOSIS — Z30.09 VASECTOMY EVALUATION: Primary | ICD-10-CM

## 2024-03-12 PROCEDURE — 3074F SYST BP LT 130 MM HG: CPT | Mod: CPTII,S$GLB,, | Performed by: UROLOGY

## 2024-03-12 PROCEDURE — 3008F BODY MASS INDEX DOCD: CPT | Mod: CPTII,S$GLB,, | Performed by: UROLOGY

## 2024-03-12 PROCEDURE — 3078F DIAST BP <80 MM HG: CPT | Mod: CPTII,S$GLB,, | Performed by: UROLOGY

## 2024-03-12 PROCEDURE — 99999 PR PBB SHADOW E&M-EST. PATIENT-LVL III: CPT | Mod: PBBFAC,,, | Performed by: UROLOGY

## 2024-03-12 PROCEDURE — 1159F MED LIST DOCD IN RCRD: CPT | Mod: CPTII,S$GLB,, | Performed by: UROLOGY

## 2024-03-12 PROCEDURE — 1160F RVW MEDS BY RX/DR IN RCRD: CPT | Mod: CPTII,S$GLB,, | Performed by: UROLOGY

## 2024-03-12 PROCEDURE — 99203 OFFICE O/P NEW LOW 30 MIN: CPT | Mod: S$GLB,,, | Performed by: UROLOGY

## 2024-03-12 RX ORDER — DIAZEPAM 5 MG/1
5 TABLET ORAL ONCE
Qty: 3 TABLET | Refills: 0 | Status: SHIPPED | OUTPATIENT
Start: 2024-03-12 | End: 2024-03-27 | Stop reason: SDUPTHER

## 2024-03-12 RX ORDER — HYDROCODONE BITARTRATE AND ACETAMINOPHEN 5; 325 MG/1; MG/1
1 TABLET ORAL EVERY 6 HOURS PRN
Qty: 7 TABLET | Refills: 0 | Status: SHIPPED | OUTPATIENT
Start: 2024-03-12 | End: 2024-03-17

## 2024-03-27 ENCOUNTER — TELEPHONE (OUTPATIENT)
Dept: UROLOGY | Facility: CLINIC | Age: 24
End: 2024-03-27
Payer: COMMERCIAL

## 2024-03-27 RX ORDER — DIAZEPAM 5 MG/1
5 TABLET ORAL ONCE
Qty: 3 TABLET | Refills: 0 | Status: SHIPPED | OUTPATIENT
Start: 2024-03-27 | End: 2024-03-27

## 2024-03-27 NOTE — TELEPHONE ENCOUNTER
Prescription sent to [facility] requested.        ----- Message from Thais Wang RN sent at 3/27/2024 12:15 PM CDT -----    ----- Message -----  From: Brittany Stiles  Sent: 3/27/2024  12:11 PM CDT  To: Sathish Dinh Staff    Pt calling in regards to Rx diazePAM (VALIUM) 5 MG tablet not being covered at pharmacy , wants it sent to            [facility]  [facility] [address]  [address] [phone]

## 2024-04-02 ENCOUNTER — TELEPHONE (OUTPATIENT)
Dept: UROLOGY | Facility: CLINIC | Age: 24
End: 2024-04-02

## 2024-04-03 ENCOUNTER — PROCEDURE VISIT (OUTPATIENT)
Dept: UROLOGY | Facility: CLINIC | Age: 24
End: 2024-04-03
Payer: COMMERCIAL

## 2024-04-03 VITALS
DIASTOLIC BLOOD PRESSURE: 67 MMHG | BODY MASS INDEX: 16.59 KG/M2 | WEIGHT: 118.94 LBS | RESPIRATION RATE: 16 BRPM | SYSTOLIC BLOOD PRESSURE: 104 MMHG | TEMPERATURE: 98 F | HEART RATE: 73 BPM

## 2024-04-03 DIAGNOSIS — Z30.09 VASECTOMY EVALUATION: Primary | ICD-10-CM

## 2024-04-03 PROCEDURE — 55250 REMOVAL OF SPERM DUCT(S): CPT | Mod: S$GLB,,, | Performed by: UROLOGY

## 2024-04-03 RX ORDER — LIDOCAINE HYDROCHLORIDE 10 MG/ML
10 INJECTION INFILTRATION; PERINEURAL
Status: COMPLETED | OUTPATIENT
Start: 2024-04-03 | End: 2024-04-03

## 2024-04-03 RX ADMIN — LIDOCAINE HYDROCHLORIDE 10 ML: 10 INJECTION INFILTRATION; PERINEURAL at 01:04

## 2024-04-03 NOTE — PROCEDURES
Pre-procedure diagnosis: Desired infertility  Post-procedure diagnosis: same    Procedure : Bilateral vasectomy (no scalpel technique)    Surgeon: Fabrizio Bower MD    Specimens: none    Complications:none    EBL: minimal    Anesthesia: 5ml of 2% lidocaine plain    Procedure in detail:  The risks and benefits of the procedure were explained to the patient and he consented.  He is aware this is elective and there are other forms of birth control.  The genitalia was prepped and draped in a sterile fashion.  Bilateral vas were identified.  5ml of 2% lidocaine plain used for local anesthesia.  A single midline scrotal incision was made without a scalpel using the vas dissector. The vas deferens were identified bilaterally and isolated and cut.  The ends were cauterized and suture ligated in opposite directions. The distal end of the vas was buried in a separate fascial plane than the proximal end by fascial interposition by a chromic suture.  This was done bilaterally.  There was no active bleeding. The incision was closed. The suture was a 3.0 chromic.    The patient was given standard vasectomy instructions.  He will continue on pain medication as needed.    He understands he is still fertile until a negative semen analysis.

## 2024-04-03 NOTE — PATIENT INSTRUCTIONS
What to Expect After a Vasectomy  You cannot drive or operate heavy machinery on the day of the procedure.    Apply ice packs to the scrotal area for 24-48 hours. Avoid direct contact of the ice pack with the skin. Scrotal supports, jock straps, or fitted underwear help elevate the scrotum and reduce discomfort.    You may shower the next day. Gently apply soapy water to the scrotum to wash. Rinse and dry yourself by blotting the skin, not rubbing.    Avoid strenuous physical exercises or sexual relations for at least one week after a vasectomy.    Continue to use birth control for at least 6 weeks or 10-20 ejaculations. You are still considered fertile until your urologist examines a post-vasectomy semen analysis at 6 weeks and perhaps one at 8 weeks as well. Drop off the specimen at the Urology Department, 2nd floor, [facility] between 8am and 4pm.    Do NOT resume unprotected sexual activity until your physician finds no sperm in your semen.    All stitches will dissolve on their own in 1-2 weeks.    Signs and Symptoms to Report  A large amount of bleeding at the site  An unusual amount of pain  A large amount of swelling in the scrotum  Fever and chills  Any signs of infection, such as redness at the site or foul-smelling drainage    Risks  The risks of complication after vasectomy are very low. A few of the risks include:  Bleeding  Infection  Scrotal hematoma - a collection of blood in the scrotum  Inflammation of the epididymis - inflammation of a structure next to the testicle that helps in maturation of sperm  Sperm granuloma - a collection of sperm that leaks out from the vas deferens, forming a small nodule or lump. This does not usually cause any discomfort but you may feel it in the scrotum.  Recanalization - the restoration of the lumen or transport tube between the two ends of the vas deferens, possibly causing fertility    If you have any questions or concerns, please call your [facility]  urologist at [phone].

## 2024-05-22 ENCOUNTER — TELEPHONE (OUTPATIENT)
Dept: UROLOGY | Facility: CLINIC | Age: 24
End: 2024-05-22
Payer: COMMERCIAL

## 2025-07-23 NOTE — PLAN OF CARE
Problem: Adult Inpatient Plan of Care  Goal: Plan of Care Review  Outcome: Ongoing (interventions implemented as appropriate)  Patient awake, alert, and oriented x 4. Patient safety maintained. Complains of pain. Relieved with prn Norco. Patient's oxygen saturation 100% on 15L non-rebreather mask. Ambulates to bathroom independently. Slept well throughout the night. Appears to be in no distress. Will continue to monitor.        DISCHARGE